# Patient Record
Sex: MALE | Race: WHITE | NOT HISPANIC OR LATINO | Employment: OTHER | URBAN - METROPOLITAN AREA
[De-identification: names, ages, dates, MRNs, and addresses within clinical notes are randomized per-mention and may not be internally consistent; named-entity substitution may affect disease eponyms.]

---

## 2017-05-17 DIAGNOSIS — G40.909 SEIZURE DISORDER (HCC): ICD-10-CM

## 2017-05-17 RX ORDER — PHENOBARBITAL 30 MG/1
30 TABLET ORAL 3 TIMES DAILY
Qty: 90 TAB | Refills: 0 | Status: SHIPPED | OUTPATIENT
Start: 2017-05-17 | End: 2017-05-18 | Stop reason: SDUPTHER

## 2017-05-17 NOTE — TELEPHONE ENCOUNTER
Left msg for pt to schedule an appt.      Was the patient seen in the last year in this department? Yes  9/15/16  Does patient have an active prescription for medications requested? No     Received Request Via: Pharmacy      Requested Prescriptions     Pending Prescriptions Disp Refills   • phenobarbital 30 MG Tab 90 Tab 0     Sig: Take 1 Tab by mouth 3 times a day.

## 2017-05-18 DIAGNOSIS — G40.909 SEIZURE DISORDER (HCC): ICD-10-CM

## 2017-05-18 RX ORDER — PHENOBARBITAL 30 MG/1
30 TABLET ORAL 3 TIMES DAILY
Qty: 90 TAB | Refills: 0 | Status: SHIPPED | OUTPATIENT
Start: 2017-05-18 | End: 2018-04-23 | Stop reason: SDUPTHER

## 2017-06-16 DIAGNOSIS — G40.909 SEIZURE DISORDER (HCC): ICD-10-CM

## 2017-06-16 RX ORDER — PHENOBARBITAL 30 MG/1
TABLET ORAL
Qty: 90 TAB | Refills: 0 | Status: SHIPPED
Start: 2017-06-16 | End: 2017-06-16 | Stop reason: SDUPTHER

## 2017-06-16 RX ORDER — PHENOBARBITAL 30 MG/1
TABLET ORAL
Qty: 90 TAB | Refills: 0 | Status: SHIPPED
Start: 2017-06-16 | End: 2017-10-09 | Stop reason: SDUPTHER

## 2017-06-16 NOTE — TELEPHONE ENCOUNTER
Wanda, [phone]    Pt friend called requesting refill on pt seizure medication, he has medicaid and cannot afford to come in for cash pay. Please advise.

## 2017-07-24 RX ORDER — PHENOBARBITAL 32.4 MG/1
TABLET ORAL
Qty: 90 TAB | Refills: 2 | Status: SHIPPED
Start: 2017-07-24 | End: 2017-07-25 | Stop reason: SDUPTHER

## 2017-07-24 NOTE — TELEPHONE ENCOUNTER
Pt was advised to schedule OV for renewals on med twice. Pt has medicaid and we do not accept this insurance. Pt spouse states she is aware of that and has been trying to get Pt new insurance but is currently in the hospital now and she takes care of all his medication and has not been able to fill out his application for new insurance and states he is not able to fill out his own paper work. Pt is currently out of medication. Spouse states she would appreciate a limited supply until she is able to complete forms for new insurance or able to get him in with a new PCP. Please advise.       Was the patient seen in the last year in this department? Yes     Does patient have an active prescription for medications requested? No     Received Request Via: Patient

## 2017-07-25 DIAGNOSIS — G40.909 SEIZURE DISORDER (HCC): ICD-10-CM

## 2017-07-25 RX ORDER — PHENOBARBITAL 32.4 MG/1
TABLET ORAL
Qty: 90 TAB | Refills: 2 | Status: SHIPPED
Start: 2017-07-25 | End: 2018-08-08 | Stop reason: SDUPTHER

## 2017-10-09 ENCOUNTER — OFFICE VISIT (OUTPATIENT)
Dept: MEDICAL GROUP | Facility: MEDICAL CENTER | Age: 62
End: 2017-10-09
Attending: NURSE PRACTITIONER
Payer: MEDICAID

## 2017-10-09 VITALS
DIASTOLIC BLOOD PRESSURE: 64 MMHG | RESPIRATION RATE: 16 BRPM | OXYGEN SATURATION: 95 % | HEART RATE: 80 BPM | TEMPERATURE: 98.1 F | SYSTOLIC BLOOD PRESSURE: 118 MMHG | HEIGHT: 75 IN | BODY MASS INDEX: 22.38 KG/M2 | WEIGHT: 180 LBS

## 2017-10-09 DIAGNOSIS — Z13.29 SCREENING FOR THYROID DISORDER: ICD-10-CM

## 2017-10-09 DIAGNOSIS — Z12.11 SCREEN FOR COLON CANCER: ICD-10-CM

## 2017-10-09 DIAGNOSIS — Z00.00 ROUTINE HEALTH MAINTENANCE: ICD-10-CM

## 2017-10-09 DIAGNOSIS — G40.909 SEIZURE DISORDER (HCC): ICD-10-CM

## 2017-10-09 DIAGNOSIS — Z13.220 SCREENING CHOLESTEROL LEVEL: ICD-10-CM

## 2017-10-09 DIAGNOSIS — Z13.21 ENCOUNTER FOR VITAMIN DEFICIENCY SCREENING: ICD-10-CM

## 2017-10-09 DIAGNOSIS — Z12.5 SCREENING PSA (PROSTATE SPECIFIC ANTIGEN): ICD-10-CM

## 2017-10-09 PROCEDURE — 99214 OFFICE O/P EST MOD 30 MIN: CPT | Performed by: NURSE PRACTITIONER

## 2017-10-09 PROCEDURE — 99213 OFFICE O/P EST LOW 20 MIN: CPT | Performed by: NURSE PRACTITIONER

## 2017-10-09 RX ORDER — PHENYTOIN SODIUM 100 MG/1
200 CAPSULE, EXTENDED RELEASE ORAL 2 TIMES DAILY
Qty: 120 CAP | Refills: 2 | Status: SHIPPED | OUTPATIENT
Start: 2017-10-09 | End: 2018-01-05 | Stop reason: SDUPTHER

## 2017-10-09 RX ORDER — PHENOBARBITAL 30 MG/1
TABLET ORAL
Qty: 90 TAB | Refills: 2 | Status: SHIPPED | OUTPATIENT
Start: 2017-10-09 | End: 2018-01-23 | Stop reason: SDUPTHER

## 2017-10-09 ASSESSMENT — PAIN SCALES - GENERAL: PAINLEVEL: 5=MODERATE PAIN

## 2017-10-09 NOTE — ASSESSMENT & PLAN NOTE
"Pt has known seizure disorder. Notes reviewed from 10/2016 admit show Pt had T/C sz and was homicidal when post ictal.  Patient reports last time he had any medications for Sz disorder about 4 days ago.  Sometimes gets staring sz and 'cant talk\" for about a minute.  States takes phenobarbital 30 mg TID and Dilantin   "

## 2017-10-09 NOTE — ASSESSMENT & PLAN NOTE
Flu Shot:  I discussed the flu vaccine and why I recommend he get it today, but pt not interested.    Seizure Safety:  Pt is not too interested in Neurology referral as he has had Sz since a child.  I strongly recommended he see Neurology for Consult, avoid alcohol or driving

## 2017-10-09 NOTE — PROGRESS NOTES
"    Chief Complaint   Patient presents with   • Establish Care   • Medication Refill     phenytoin   • Seizure         HISTORY OF THE PRESENT ILLNESS: Patient is a 62 y.o. male.  Gilbert presents to the clinic to establish care as New Patient.    His PMH includes:    Neck Pain  Low Back Pain w sciatica  Seizure Disorder  Tobacco Use-Smoking  Marijuana use  Mild Anemia by previous lab work (10/2016)    REview of Records shows:  Multiple Telephone Encounters for refill of Seizure Meds via [facility]  10/24--> 10/28/2016 Hospital Admit for Seizures, Homicidal Ideation while Post ictal, Leukocytosis    [place]  Report shows:  9/24/17 Phenobarbital 30 mg # 90 by  Julia Chambers  7/27/17 Phenobarbital 32.4 mg # 90 by Pippa Radford ( [facility])  7/24/17 Phenobarbital 30 mg # 90           \"  Similar entries in report for Phenobarbital refills.......  11 Rx and 3 Prescribers................    Seizure disorder  Pt has known seizure disorder. Notes reviewed from 10/2016 admit show Pt had T/C sz and was homicidal when post ictal.  Patient reports last time he had any medications for Sz disorder about 4 days ago.  Sometimes gets staring sz and 'cant talk\" for about a minute.  States takes phenobarbital 30 mg TID and Dilantin     Routine health maintenance  Flu Shot:  I discussed the flu vaccine and why I recommend he get it today, but pt not interested.    Seizure Safety:  Pt is not too interested in Neurology referral as he has had Sz since a child.  I strongly recommended he see Neurology for Consult, avoid alcohol or driving      Allergies: Nkda [no known drug allergy]    Current Outpatient Prescriptions Ordered in [facility]   Medication Sig Dispense Refill   • phenytoin ER (DILANTIN) 100 MG Cap Take 2 Caps by mouth 2 times a day. 120 Cap 2   • phenobarbital 30 MG Tab TAKE three tabs po daily 90 Tab 2   • PHENobarbital (LUMINAL) 32.4 MG Tab TAKE ONE TABLET BY MOUTH THREE TIMES DAILY 90 Tab 2   • " phenobarbital 30 MG Tab Take 1 Tab by mouth 3 times a day. 90 Tab 0   • phenobarbital 30 MG Tab Take 1 Tab by mouth 3 times a day. 90 Tab 3   • phenobarbital (LUMINAL) 30 mg TABS TAKE ONE TABLET BY MOUTH THREE TIMES DAILY 90 Tab 5   • phenobarbital (LUMINAL) 30 mg TABS TAKE ONE TABLET BY MOUTH THREE TIMES DAILY 90 Each 0     No current [facility]-ordered facility-administered medications on file.        Past Medical History:   Diagnosis Date   • Seizure     petit mal, onset childhood       Past Surgical History:   Procedure Laterality Date   • APPENDECTOMY         Social History   Substance Use Topics   • Smoking status: Current Every Day Smoker     Years: 20.00     Types: Cigars   • Smokeless tobacco: Never Used      Comment: advise no tobacco products   • Alcohol use 0.0 oz/week      Comment: occasional beer       Family Status   Relation Status   • Maternal Grandmother    • Mother    • Father Alive     Family History   Problem Relation Age of Onset   • Heart Disease Maternal Grandmother    • Stroke Paternal Grandmother    • Cancer Neg Hx    • Diabetes Neg Hx    • GI Mother        Review of Systems   Constitutional: Negative for fever, chills, weight loss and malaise/fatigue.   HENT: Negative for ear pain, nosebleeds, congestion, sore throat and neck pain.    Eyes: Negative for blurred vision.   Respiratory: Negative for cough, sputum production, shortness of breath and wheezing.    Cardiovascular: Negative for chest pain, palpitations, orthopnea and leg swelling.   Gastrointestinal: Negative for heartburn, nausea, vomiting and abdominal pain.   Genitourinary: Negative for dysuria, urgency and frequency.   Musculoskeletal: Negative for myalgias, back pain and joint pain.   Skin: Negative for rash and itching.   Neurological: Negative for dizziness, tingling, tremors, sensory change, focal weakness and headaches. Positive for Sz Disorder  Endo/Heme/Allergies: Does not bruise/bleed easily.  "  Psychiatric/Behavioral: Negative for depression, anxiety, or memory loss.       Current medications, allergies, and problem list reviewed with patient and updated in  [facility] today.      Exam: Blood pressure 118/64, pulse 80, temperature 36.7 °C (98.1 °F), resp. rate 16, height 1.905 m (6' 3\"), weight 81.6 kg (180 lb), SpO2 95 %.  General: Normal appearing. No distress.  HEENT: Normocephalic. Eyes conjunctiva clear lids without ptosis, pupils equal and reactive to light accommodation, ears normal shape and contour, canals are clear bilaterally, TM's are benign, nasal mucosa benign, oropharynx is without erythema, edema or exudates.   Neck: Supple without JVD. Thyroid is not enlarged.  Pulmonary: Clear to ausculation.  Normal effort. No rales, ronchi, or wheezing.  Cardiovascular: Regular rate and rhythm. Radial pulses are intact and equal bilaterally.  Abdomen: Soft, nontender, nondistended.   Neurologic: Grossly nonfocal  Lymph: No cervical or supraclavicular lymph nodes are palpable  Skin: Warm and dry.  No obvious lesions.  Musculoskeletal: Normal gait. No extremity cyanosis, clubbing, or edema.  Psych:Slightly on edge or mild cantankerous talk and affect. Alert and oriented x3. Judgment and insight is normal.        Assessment/Plan  1. Routine health maintenance  CBC WITH DIFFERENTIAL    COMP METABOLIC PANEL   2. Screening for thyroid disorder  TSH   3. Encounter for vitamin deficiency screening  VITAMIN D,25 HYDROXY    VITAMIN B12   4. Screening cholesterol level  LIPID PROFILE   5. Screening PSA (prostate specific antigen)  PROSTATE SPECIFIC AG SCREENING   6. Screen for colon cancer  OCCULT BLOOD FECES IMMUNOASSAY   7. Seizure disorder (CMS-HCC)  DILANTIN, PHENOBARBITAL LEVELS    REFERRAL TO NEUROLOGY    phenytoin ER (DILANTIN) 100 MG Cap (200 mg BID)    phenobarbital 30 MG Tab TID   Follow up in 1 month for review of labs. Call or return if questions, concerns, or worsening condition.  Instructed Pt to avoid " alcohol and do not drive vehicles.

## 2017-11-06 ENCOUNTER — HOSPITAL ENCOUNTER (OUTPATIENT)
Dept: LAB | Facility: MEDICAL CENTER | Age: 62
End: 2017-11-06
Attending: NURSE PRACTITIONER
Payer: MEDICAID

## 2017-11-06 DIAGNOSIS — Z13.220 SCREENING CHOLESTEROL LEVEL: ICD-10-CM

## 2017-11-06 DIAGNOSIS — Z13.21 ENCOUNTER FOR VITAMIN DEFICIENCY SCREENING: ICD-10-CM

## 2017-11-06 DIAGNOSIS — Z12.5 SCREENING PSA (PROSTATE SPECIFIC ANTIGEN): ICD-10-CM

## 2017-11-06 DIAGNOSIS — Z13.29 SCREENING FOR THYROID DISORDER: ICD-10-CM

## 2017-11-06 DIAGNOSIS — G40.909 SEIZURE DISORDER (HCC): ICD-10-CM

## 2017-11-06 DIAGNOSIS — Z00.00 ROUTINE HEALTH MAINTENANCE: ICD-10-CM

## 2017-11-06 LAB
25(OH)D3 SERPL-MCNC: 10 NG/ML (ref 30–100)
ALBUMIN SERPL BCP-MCNC: 4 G/DL (ref 3.2–4.9)
ALBUMIN/GLOB SERPL: 1.4 G/DL
ALP SERPL-CCNC: 87 U/L (ref 30–99)
ALT SERPL-CCNC: 25 U/L (ref 2–50)
ANION GAP SERPL CALC-SCNC: 6 MMOL/L (ref 0–11.9)
AST SERPL-CCNC: 20 U/L (ref 12–45)
BASOPHILS # BLD AUTO: 0.8 % (ref 0–1.8)
BASOPHILS # BLD: 0.07 K/UL (ref 0–0.12)
BILIRUB SERPL-MCNC: 0.7 MG/DL (ref 0.1–1.5)
BUN SERPL-MCNC: 13 MG/DL (ref 8–22)
CALCIUM SERPL-MCNC: 9.3 MG/DL (ref 8.5–10.5)
CHLORIDE SERPL-SCNC: 102 MMOL/L (ref 96–112)
CHOLEST SERPL-MCNC: 157 MG/DL (ref 100–199)
CO2 SERPL-SCNC: 27 MMOL/L (ref 20–33)
CREAT SERPL-MCNC: 0.75 MG/DL (ref 0.5–1.4)
EOSINOPHIL # BLD AUTO: 0 K/UL (ref 0–0.51)
EOSINOPHIL NFR BLD: 0 % (ref 0–6.9)
ERYTHROCYTE [DISTWIDTH] IN BLOOD BY AUTOMATED COUNT: 43.7 FL (ref 35.9–50)
GFR SERPL CREATININE-BSD FRML MDRD: >60 ML/MIN/1.73 M 2
GLOBULIN SER CALC-MCNC: 2.8 G/DL (ref 1.9–3.5)
GLUCOSE SERPL-MCNC: 75 MG/DL (ref 65–99)
HCT VFR BLD AUTO: 49.1 % (ref 42–52)
HDLC SERPL-MCNC: 52 MG/DL
HGB BLD-MCNC: 16.8 G/DL (ref 14–18)
IMM GRANULOCYTES # BLD AUTO: 0.03 K/UL (ref 0–0.11)
IMM GRANULOCYTES NFR BLD AUTO: 0.3 % (ref 0–0.9)
LDLC SERPL CALC-MCNC: 92 MG/DL
LYMPHOCYTES # BLD AUTO: 2.45 K/UL (ref 1–4.8)
LYMPHOCYTES NFR BLD: 28.6 % (ref 22–41)
MCH RBC QN AUTO: 32.6 PG (ref 27–33)
MCHC RBC AUTO-ENTMCNC: 34.2 G/DL (ref 33.7–35.3)
MCV RBC AUTO: 95.3 FL (ref 81.4–97.8)
MONOCYTES # BLD AUTO: 0.33 K/UL (ref 0–0.85)
MONOCYTES NFR BLD AUTO: 3.8 % (ref 0–13.4)
NEUTROPHILS # BLD AUTO: 5.7 K/UL (ref 1.82–7.42)
NEUTROPHILS NFR BLD: 66.5 % (ref 44–72)
NRBC # BLD AUTO: 0 K/UL
NRBC BLD AUTO-RTO: 0 /100 WBC
PHENOBARB SERPL-MCNC: 17.4 UG/ML (ref 15–40)
PHENYTOIN SERPL-MCNC: 10 UG/ML (ref 10–20)
PLATELET # BLD AUTO: 301 K/UL (ref 164–446)
PMV BLD AUTO: 9.4 FL (ref 9–12.9)
POTASSIUM SERPL-SCNC: 4.2 MMOL/L (ref 3.6–5.5)
PROT SERPL-MCNC: 6.8 G/DL (ref 6–8.2)
PSA SERPL-MCNC: 0.64 NG/ML (ref 0–4)
RBC # BLD AUTO: 5.15 M/UL (ref 4.7–6.1)
SODIUM SERPL-SCNC: 135 MMOL/L (ref 135–145)
TRIGL SERPL-MCNC: 65 MG/DL (ref 0–149)
TSH SERPL DL<=0.005 MIU/L-ACNC: 1.65 UIU/ML (ref 0.3–3.7)
VIT B12 SERPL-MCNC: 316 PG/ML (ref 211–911)
WBC # BLD AUTO: 8.6 K/UL (ref 4.8–10.8)

## 2017-11-06 PROCEDURE — 82607 VITAMIN B-12: CPT

## 2017-11-06 PROCEDURE — 80053 COMPREHEN METABOLIC PANEL: CPT

## 2017-11-06 PROCEDURE — 80185 ASSAY OF PHENYTOIN TOTAL: CPT

## 2017-11-06 PROCEDURE — 84443 ASSAY THYROID STIM HORMONE: CPT

## 2017-11-06 PROCEDURE — 36415 COLL VENOUS BLD VENIPUNCTURE: CPT

## 2017-11-06 PROCEDURE — 80184 ASSAY OF PHENOBARBITAL: CPT

## 2017-11-06 PROCEDURE — 80061 LIPID PANEL: CPT

## 2017-11-06 PROCEDURE — 84153 ASSAY OF PSA TOTAL: CPT

## 2017-11-06 PROCEDURE — 82306 VITAMIN D 25 HYDROXY: CPT

## 2017-11-06 PROCEDURE — 85025 COMPLETE CBC W/AUTO DIFF WBC: CPT

## 2017-11-08 ENCOUNTER — OFFICE VISIT (OUTPATIENT)
Dept: MEDICAL GROUP | Facility: MEDICAL CENTER | Age: 62
End: 2017-11-08
Attending: NURSE PRACTITIONER
Payer: MEDICAID

## 2017-11-08 VITALS
OXYGEN SATURATION: 94 % | RESPIRATION RATE: 16 BRPM | WEIGHT: 180 LBS | DIASTOLIC BLOOD PRESSURE: 60 MMHG | TEMPERATURE: 98 F | SYSTOLIC BLOOD PRESSURE: 102 MMHG | BODY MASS INDEX: 22.38 KG/M2 | HEIGHT: 75 IN | HEART RATE: 64 BPM

## 2017-11-08 DIAGNOSIS — E55.9 VITAMIN D DEFICIENCY: ICD-10-CM

## 2017-11-08 DIAGNOSIS — G40.909 SEIZURE DISORDER (HCC): ICD-10-CM

## 2017-11-08 PROCEDURE — 99213 OFFICE O/P EST LOW 20 MIN: CPT | Performed by: NURSE PRACTITIONER

## 2017-11-08 ASSESSMENT — PATIENT HEALTH QUESTIONNAIRE - PHQ9: CLINICAL INTERPRETATION OF PHQ2 SCORE: 0

## 2017-11-08 NOTE — ASSESSMENT & PLAN NOTE
Pt has known Sz disorder and is taking Phenobarbital (90 mg /day) and Dilantin( 200 mg/day)  We reviewed his low normal levels of both.  I strongly recommended he call Neurology ( Dr Barber referral approved) for consult r/t his   Long standing Sz disorder.

## 2017-11-08 NOTE — ASSESSMENT & PLAN NOTE
We reviewed all his lab results including his very low Vitamin D level.  I recommended a daily Vit D3 RX 4,000 units a day.  We discussed the important reasons to have an adequate Vit D level.

## 2017-11-08 NOTE — PROGRESS NOTES
"    Chief Complaint: Results    HPI:  Gilbert presents to the clinic for results.    His PMH includes:  Neck Pain  Low Back Pain w sciatica  Seizure Disorder  Tobacco Use-Smoking  Marijuana use  Mild Anemia by previous lab work (10/2016)     REview of Records shows:     10/9/17 Clinic New Patient Visit, Labs ordered, RX refills. Referral to Neurology r/t Sz's  Multiple Telephone Encounters for refill of Seizure Meds via [facility]  10/24--> 10/28/2016 Hospital Admit for Seizures, Homicidal Ideation while Post ictal, Leukocytosis     [place]  Report shows:  9/24/17 Phenobarbital 30 mg # 90 by  Julia Chambers  7/27/17 Phenobarbital 32.4 mg # 90 by Pippa Radford ( [facility])  7/24/17 Phenobarbital 30 mg # 90           \"  Similar entries in report for Phenobarbital refills.......  11 Rx and 3 Prescribers................    Results Review:  11/6/17 Labs- CBC normal, CMP normal, Lipids normal, Vit B12 normal, PSA= 0.64, TSH= 1.650                          Dilantin= 10.0 ( 10-20), Phenobarbital Level= 17.4 ( 15-40)                         Vitamin D= 10 (very low)    Seizure disorder  Pt has known Sz disorder and is taking Phenobarbital (90 mg /day) and Dilantin( 200 mg/day)  We reviewed his low normal levels of both.  I strongly recommended he call Neurology ( Dr Barber referral approved) for consult r/t his   Long standing Sz disorder.      Vitamin D deficiency  We reviewed all his lab results including his very low Vitamin D level.  I recommended a daily Vit D3 RX 4,000 units a day.  We discussed the important reasons to have an adequate Vit D level.      Patient Active Problem List    Diagnosis Date Noted   • Vitamin D deficiency 11/08/2017   • Routine health maintenance 10/09/2017   • Seizure disorder (CMS-[facility]) 12/05/2014       Allergies:Nkda [no known drug allergy]    Current Outpatient Prescriptions   Medication Sig Dispense Refill   • Cholecalciferol 4000 units Cap Take 1 Capsule by mouth " "every day. 30 Cap 5   • phenytoin ER (DILANTIN) 100 MG Cap Take 2 Caps by mouth 2 times a day. 120 Cap 2   • phenobarbital 30 MG Tab TAKE three tabs po daily 90 Tab 2   • PHENobarbital (LUMINAL) 32.4 MG Tab TAKE ONE TABLET BY MOUTH THREE TIMES DAILY 90 Tab 2   • phenobarbital 30 MG Tab Take 1 Tab by mouth 3 times a day. 90 Tab 0   • phenobarbital 30 MG Tab Take 1 Tab by mouth 3 times a day. 90 Tab 3   • phenobarbital (LUMINAL) 30 mg TABS TAKE ONE TABLET BY MOUTH THREE TIMES DAILY 90 Tab 5   • phenobarbital (LUMINAL) 30 mg TABS TAKE ONE TABLET BY MOUTH THREE TIMES DAILY 90 Each 0     No current facility-administered medications for this visit.        Social History   Substance Use Topics   • Smoking status: Current Every Day Smoker     Years: 20.00     Types: Cigars   • Smokeless tobacco: Never Used      Comment: advise no tobacco products   • Alcohol use 0.0 oz/week      Comment: occasional beer       Family History   Problem Relation Age of Onset   • Heart Disease Maternal Grandmother    • GI Mother    • Stroke Paternal Grandmother    • Cancer Neg Hx    • Diabetes Neg Hx        ROS:  Review of Systems   See HPI Above    Exam:  Blood pressure 102/60, pulse 64, temperature 36.7 °C (98 °F), resp. rate 16, height 1.905 m (6' 3\"), weight 81.6 kg (180 lb), SpO2 94 %.  General:  Well nourished, well developed male in NAD  HENT:Head is grossly normal. PERRL.  Neck: Supple. Trachea is midline.  Pulmonary: Clear to ausculation .  Normal effort. No rales, ronchi, or wheezing.   Cardiovascular: Regular rate and rhythm.  Abdomen-Abdomen is soft, No tenderness.  Upper extremities- Strong = . Good ROM  Lower extremities- neg for edema, redness, tenderness.  Neuro- A & O x 4. Speech clear and appropriate.     Current medications, allergies, and problem list reviewed with patient and updated in  [facility] today.    Assessment/Plan:  1. Seizure disorder (CMS-HCC)  Continue Phenobarbital 300 mg/day  And Dilantin 400 mg/day.  Pt " instructed to call Neurology-Dr Barber for consultation appt    2. Vitamin D deficiency  Cholecalciferol 4000 units Cap   Follow up in 3 months. Call or return if questions, concerns, or worsening condition.

## 2018-01-05 DIAGNOSIS — G40.909 SEIZURE DISORDER (HCC): ICD-10-CM

## 2018-01-08 RX ORDER — PHENYTOIN SODIUM 100 MG/1
CAPSULE, EXTENDED RELEASE ORAL
Qty: 120 CAP | Refills: 1 | Status: SHIPPED | OUTPATIENT
Start: 2018-01-08 | End: 2018-03-05 | Stop reason: SDUPTHER

## 2018-01-23 DIAGNOSIS — G40.909 SEIZURE DISORDER (HCC): ICD-10-CM

## 2018-01-23 RX ORDER — PHENOBARBITAL 30 MG/1
TABLET ORAL
Qty: 90 TAB | Refills: 1 | Status: SHIPPED | OUTPATIENT
Start: 2018-01-23 | End: 2018-01-26 | Stop reason: SDUPTHER

## 2018-01-26 DIAGNOSIS — G40.909 SEIZURE DISORDER (HCC): ICD-10-CM

## 2018-01-26 RX ORDER — PHENOBARBITAL 30 MG/1
TABLET ORAL
Qty: 90 TAB | Refills: 3 | Status: SHIPPED | OUTPATIENT
Start: 2018-01-26 | End: 2018-02-25

## 2018-03-05 DIAGNOSIS — G40.909 SEIZURE DISORDER (HCC): ICD-10-CM

## 2018-03-05 RX ORDER — PHENYTOIN SODIUM 100 MG/1
CAPSULE, EXTENDED RELEASE ORAL
Qty: 120 CAP | Refills: 2 | Status: SHIPPED | OUTPATIENT
Start: 2018-03-05 | End: 2018-06-05 | Stop reason: SDUPTHER

## 2018-03-05 NOTE — TELEPHONE ENCOUNTER
Please call Gilbert and let him know I refilled his Dilantin( phenytoin) RX today.  Please remind him that he was referred to Neurology ( DR Barber) back in November for his Seizure Disorder. I have placed a new referral as that one probably has .  He needs to call our Referral Dept  At the end of this week to find out which Neurologist is approved and to call and make an appt.  Gordo

## 2018-04-23 DIAGNOSIS — G40.909 SEIZURE DISORDER (HCC): ICD-10-CM

## 2018-04-23 RX ORDER — PHENOBARBITAL 30 MG/1
TABLET ORAL
Qty: 90 TAB | Refills: 2 | Status: SHIPPED | OUTPATIENT
Start: 2018-04-23 | End: 2018-05-23

## 2018-04-23 NOTE — TELEPHONE ENCOUNTER
Please call Gilbert and let him know I refilled his Seizure medication, Phenobarbital today, but he needs to call and establish w Neurology  Referral recently approved:  [facility]   [address]  [id number]  Ph: [phone]      Please call this number after your appointment has been made to set up your transportation to [place]. [facility]SP[facility]  [phone]

## 2018-06-05 DIAGNOSIS — G40.909 SEIZURE DISORDER (HCC): ICD-10-CM

## 2018-06-06 RX ORDER — PHENYTOIN SODIUM 100 MG/1
CAPSULE, EXTENDED RELEASE ORAL
Qty: 120 CAP | Refills: 1 | Status: SHIPPED | OUTPATIENT
Start: 2018-06-06 | End: 2018-08-03 | Stop reason: SDUPTHER

## 2018-07-23 DIAGNOSIS — G40.909 SEIZURE DISORDER (HCC): ICD-10-CM

## 2018-07-23 RX ORDER — PHENOBARBITAL 30 MG/1
TABLET ORAL
Qty: 90 TAB | Refills: 2 | Status: SHIPPED | OUTPATIENT
Start: 2018-07-23 | End: 2018-07-26 | Stop reason: SDUPTHER

## 2018-07-24 NOTE — TELEPHONE ENCOUNTER
Rx faxed to:    [id number] - [place], [place] - [address] [address]  Phone: [phone] Fax: [phone]    phenobarbital 30 MG Tab 90 Tab 2/2 7/23/2018 8/22/2018    Sig: TAKE 3 TABLETS ORALLY ONCE DAILY

## 2018-07-26 DIAGNOSIS — G40.909 SEIZURE DISORDER (HCC): ICD-10-CM

## 2018-07-26 RX ORDER — PHENOBARBITAL 30 MG/1
TABLET ORAL
Qty: 90 TAB | Refills: 0 | Status: SHIPPED | OUTPATIENT
Start: 2018-07-26 | End: 2018-07-26

## 2018-08-03 DIAGNOSIS — G40.909 SEIZURE DISORDER (HCC): ICD-10-CM

## 2018-08-04 RX ORDER — PHENYTOIN SODIUM 100 MG/1
CAPSULE, EXTENDED RELEASE ORAL
Qty: 120 CAP | Refills: 2 | Status: SHIPPED | OUTPATIENT
Start: 2018-08-04 | End: 2018-08-08 | Stop reason: SDUPTHER

## 2018-08-08 ENCOUNTER — OFFICE VISIT (OUTPATIENT)
Dept: MEDICAL GROUP | Facility: MEDICAL CENTER | Age: 63
End: 2018-08-08
Attending: NURSE PRACTITIONER
Payer: MEDICAID

## 2018-08-08 VITALS
WEIGHT: 176 LBS | OXYGEN SATURATION: 94 % | BODY MASS INDEX: 21.88 KG/M2 | RESPIRATION RATE: 16 BRPM | HEART RATE: 76 BPM | HEIGHT: 75 IN | TEMPERATURE: 98.9 F | SYSTOLIC BLOOD PRESSURE: 100 MMHG | DIASTOLIC BLOOD PRESSURE: 62 MMHG

## 2018-08-08 DIAGNOSIS — E55.9 VITAMIN D DEFICIENCY: ICD-10-CM

## 2018-08-08 DIAGNOSIS — G40.909 SEIZURE DISORDER (HCC): ICD-10-CM

## 2018-08-08 DIAGNOSIS — Z12.11 SCREEN FOR COLON CANCER: ICD-10-CM

## 2018-08-08 PROCEDURE — 99212 OFFICE O/P EST SF 10 MIN: CPT | Performed by: NURSE PRACTITIONER

## 2018-08-08 PROCEDURE — 99213 OFFICE O/P EST LOW 20 MIN: CPT | Performed by: NURSE PRACTITIONER

## 2018-08-08 RX ORDER — PHENYTOIN SODIUM 100 MG/1
400 CAPSULE, EXTENDED RELEASE ORAL DAILY
Qty: 120 CAP | Refills: 3 | Status: SHIPPED | OUTPATIENT
Start: 2018-08-08 | End: 2018-12-05 | Stop reason: SDUPTHER

## 2018-08-08 RX ORDER — CHOLECALCIFEROL (VITAMIN D3) 50 MCG
CAPSULE ORAL
Qty: 60 CAP | Refills: 4 | Status: SHIPPED | OUTPATIENT
Start: 2018-08-08 | End: 2018-11-29

## 2018-08-08 RX ORDER — PHENOBARBITAL 32.4 MG/1
97.2 TABLET ORAL DAILY
Qty: 90 TAB | Refills: 3 | Status: SHIPPED | OUTPATIENT
Start: 2018-08-08 | End: 2018-09-07

## 2018-08-08 NOTE — PROGRESS NOTES
Chief Complaint:   Chief Complaint   Patient presents with   • Medication Refill       HPI:  Gilbert is here today for a follow-up on Seizure Disorder    His PMH includes:  Neck Pain  Low Back Pain w sciatica  Seizure Disorder  Tobacco Use-Smoking  Marijuana use  Mild Anemia by previous lab work (10/2016)     REview of Records shows:  11/8/18 Clinic visit for REsults. SZ med refills. RX Vit D. Instructed to establish w Neurology.   11/6/17 Labs- CBC normal, CMP normal, Lipids normal, Vit B12 normal, PSA= 0.64, TSH= 1.650                          Dilantin= 10.0 ( 10-20), Phenobarbital Level= 17.4 ( 15-40)                         Vitamin D= 10 (very low)   10/9/17 Clinic New Patient Visit, Labs ordered, RX refills. Referral to Neurology r/t Bia's  Multiple Telephone Encounters for refill of Seizure Meds via [facility]  10/24--> 10/28/2016 Hospital Admit for Seizures, Homicidal Ideation while Post ictal, Leukocytosis     [place]  Report shows:  7/26/18 Phenobarbitol 30 mg # 90 by me  6/25/18 similar entry  10 RX and 3 Prescribers.  -------------------------------------------------------       Vitamin D deficiency  Pt has known Vit D def  Has not been taking Vit D  Will re-order and discussed why an optimal level of vit D is impt.    Seizure disorder  Pt is here for med refills of his SZ Disorder.  Denies any seizures this year.  Reports he did get call from neurology and now has appt to  Establish 12/5/18 w [facility] ( Quyen Bartholomew)    I have asked him to continue his current Phenobarbital and Dilantin doses  And get Pheonbarb and Dilantin Level in late November prior to his appt  w Neurology in early December.      Patient Active Problem List    Diagnosis Date Noted   • Vitamin D deficiency 11/08/2017   • Routine health maintenance 10/09/2017   • Seizure disorder (HCC) 12/05/2014       Allergies:Nkda [no known drug allergy]    Medicines as of today:  Current Outpatient Prescriptions   Medication  "Sig Dispense Refill   • PHENobarbital (LUMINAL) 32.4 MG Tab Take 3 Tabs by mouth every day for 30 days. TAKE ONE TABLET BY MOUTH THREE TIMES DAILY 90 Tab 3   • phenytoin ER (DILANTIN) 100 MG Cap Take 4 Caps by mouth every day. 120 Cap 3   • Cholecalciferol 4000 units Cap Take 1 Capsule by mouth every day. 30 Cap 5   • phenobarbital (LUMINAL) 30 mg TABS TAKE ONE TABLET BY MOUTH THREE TIMES DAILY 90 Tab 5   • phenobarbital (LUMINAL) 30 mg TABS TAKE ONE TABLET BY MOUTH THREE TIMES DAILY 90 Each 0     No current facility-administered medications for this visit.        Social History   Substance Use Topics   • Smoking status: Current Every Day Smoker     Years: 20.00     Types: Cigars   • Smokeless tobacco: Never Used      Comment: advise no tobacco products   • Alcohol use 0.0 oz/week      Comment: occasional beer       Past Medical History:   Diagnosis Date   • Seizure (CMS-HCC) (HCC)     petit mal, onset childhood       Family History   Problem Relation Age of Onset   • Heart Disease Maternal Grandmother    • GI Mother    • Stroke Paternal Grandmother    • Cancer Neg Hx    • Diabetes Neg Hx        ROS:  Review of Systems   See HPI Above    Exam:  Blood pressure 100/62, pulse 76, temperature 37.2 °C (98.9 °F), resp. rate 16, height 1.905 m (6' 3\"), weight 79.8 kg (176 lb), SpO2 94 %. Body mass index is 22 kg/m².    General:  Well nourished, well developed male in NAD  HENT:Head is grossly normal. PERRL.  Neck: Supple. Trachea is midline.  Pulmonary: Clear to ausculation.  Normal effort.   Cardiovascular: Regular rate and rhythm.  Abdomen-Abdomen is soft  Upper extremities- WNL   Lower extremities- neg for edema  Neuro- A & O x 4. Speech clear and appropriate. Ambulates w steady gait and good balance    Current medications, allergies, and problem list reviewed with patient and updated in [facility] today.    Assessment/Plan:  1. Seizure disorder (HCC)  PHENOBARBITAL Level prior to appt w neurology    DILANTIN prior to appt w " Neurology.  To keep appt w Quyen Bartholomew- [facility] set for 12/5/18 to establish.    PHENobarbital (LUMINAL) 32.4 MG Tab    phenytoin ER (DILANTIN) 100 MG Cap   2. Screen for colon cancer  OCCULT BLOOD FECES IMMUNOASSAY (FIT)   3. Vitamin D deficiency  Cholecalciferol 4000 units Cap       Return in about 6 months (around 2/8/2019).

## 2018-08-08 NOTE — ASSESSMENT & PLAN NOTE
Pt has known Vit D def  Has not been taking Vit D  Will re-order and discussed why an optimal level of vit D is impt.

## 2018-08-08 NOTE — ASSESSMENT & PLAN NOTE
Pt is here for med refills of his SZ Disorder.  Denies any seizures this year.  Reports he did get call from neurology and now has appt to  Establish 12/5/18 w [facility] ( Quyen Bartholomew)    I have asked him to continue his current Phenobarbital and Dilantin doses  And get Pheonbarb and Dilantin Level in late November prior to his appt  w Neurology in early December.

## 2018-09-18 DIAGNOSIS — G40.909 SEIZURE DISORDER (HCC): ICD-10-CM

## 2018-09-18 RX ORDER — PHENOBARBITAL 30 MG/1
TABLET ORAL
Qty: 90 TAB | Refills: 0 | Status: SHIPPED | OUTPATIENT
Start: 2018-09-18 | End: 2018-10-16 | Stop reason: SDUPTHER

## 2018-11-16 DIAGNOSIS — G40.909 SEIZURE DISORDER (HCC): ICD-10-CM

## 2018-11-19 RX ORDER — PHENOBARBITAL 30 MG/1
TABLET ORAL
Qty: 90 TAB | Refills: 0 | Status: SHIPPED | OUTPATIENT
Start: 2018-11-19 | End: 2018-11-21 | Stop reason: SDUPTHER

## 2018-11-21 DIAGNOSIS — G40.909 SEIZURE DISORDER (HCC): ICD-10-CM

## 2018-11-21 RX ORDER — PHENOBARBITAL 30 MG/1
30 TABLET ORAL 3 TIMES DAILY
Qty: 90 TAB | Refills: 0 | Status: SHIPPED | OUTPATIENT
Start: 2018-11-21 | End: 2018-11-29

## 2018-11-21 NOTE — TELEPHONE ENCOUNTER
Tried to contact patient about Rx being ready for  but number is disconnected. Rx will be at  for .

## 2018-11-26 ENCOUNTER — HOSPITAL ENCOUNTER (OUTPATIENT)
Dept: LAB | Facility: MEDICAL CENTER | Age: 63
End: 2018-11-26
Attending: NURSE PRACTITIONER
Payer: MEDICAID

## 2018-11-26 DIAGNOSIS — G40.909 SEIZURE DISORDER (HCC): ICD-10-CM

## 2018-11-26 PROCEDURE — 36415 COLL VENOUS BLD VENIPUNCTURE: CPT

## 2018-11-26 PROCEDURE — 80185 ASSAY OF PHENYTOIN TOTAL: CPT

## 2018-11-26 PROCEDURE — 80184 ASSAY OF PHENOBARBITAL: CPT

## 2018-11-27 LAB
PHENOBARB SERPL-MCNC: 13.4 UG/ML (ref 15–40)
PHENYTOIN SERPL-MCNC: 6.6 UG/ML (ref 10–20)

## 2018-11-29 ENCOUNTER — OFFICE VISIT (OUTPATIENT)
Dept: MEDICAL GROUP | Facility: MEDICAL CENTER | Age: 63
End: 2018-11-29
Attending: NURSE PRACTITIONER
Payer: MEDICAID

## 2018-11-29 VITALS
OXYGEN SATURATION: 95 % | SYSTOLIC BLOOD PRESSURE: 100 MMHG | HEIGHT: 75 IN | HEART RATE: 74 BPM | WEIGHT: 174 LBS | DIASTOLIC BLOOD PRESSURE: 60 MMHG | RESPIRATION RATE: 16 BRPM | BODY MASS INDEX: 21.64 KG/M2 | TEMPERATURE: 99.3 F

## 2018-11-29 DIAGNOSIS — G40.909 SEIZURE DISORDER (HCC): ICD-10-CM

## 2018-11-29 PROCEDURE — 99213 OFFICE O/P EST LOW 20 MIN: CPT | Performed by: NURSE PRACTITIONER

## 2018-11-29 RX ORDER — PHENOBARBITAL 32.4 MG/1
32.4 TABLET ORAL 3 TIMES DAILY
Qty: 90 TAB | Refills: 0 | Status: SHIPPED | OUTPATIENT
Start: 2018-11-29 | End: 2018-12-05 | Stop reason: SDUPTHER

## 2018-11-29 RX ORDER — ERGOCALCIFEROL 1.25 MG/1
50000 CAPSULE ORAL
Qty: 4 CAP | Refills: 5 | Status: SHIPPED | OUTPATIENT
Start: 2018-11-29 | End: 2021-06-15 | Stop reason: SDUPTHER

## 2018-11-29 RX ORDER — PHENYTOIN SODIUM 100 MG/1
CAPSULE, EXTENDED RELEASE ORAL
COMMUNITY
Start: 2018-10-01 | End: 2018-11-29

## 2018-11-29 RX ORDER — PHENYTOIN SODIUM 100 MG/1
CAPSULE, EXTENDED RELEASE ORAL
COMMUNITY
Start: 2018-10-30 | End: 2018-11-29

## 2018-11-29 ASSESSMENT — PATIENT HEALTH QUESTIONNAIRE - PHQ9: CLINICAL INTERPRETATION OF PHQ2 SCORE: 0

## 2018-11-30 NOTE — ASSESSMENT & PLAN NOTE
"Patient reports he has had absence seizures more frequently. 'smaller than before\".  States has been taking his meds as directed  Dilantin 100 mg - 4 at once  And phenobarbitol 30 mg- 3  "

## 2018-11-30 NOTE — PROGRESS NOTES
"Chief Complaint:   Chief Complaint   Patient presents with   • Seizure       HPI:  Gilbert is here today for a follow-up on Seizure Disorder.    His PMH includes:  Neck Pain  Low Back Pain w sciatica  Seizure Disorder  Tobacco Use-Smoking  Marijuana use  Mild Anemia by previous lab work (10/2016)     REview of Records shows:  11/26/18 Phenobarb= 13.4 ( 15-40), Dilantin= 6.6 ( 10-20)  8/8/18 Clinic for Med Refills. REports has 1st appt w Quyen Bartholomew on 12/5/18 to establish r/t seizures.  Order for Phenobarb and Dilantin level to complete prior to Dec appt w Neurology.    11/8/17 Clinic visit for REsults. SZ med refills. RX Vit D. Instructed to establish w Neurology.   11/6/17 Labs- CBC normal, CMP normal, Lipids normal, Vit B12 normal, PSA= 0.64, TSH= 1.650                          Dilantin= 10.0 ( 10-20), Phenobarbital Level= 17.4 ( 15-40)                         Vitamin D= 10 (very low)   10/9/17 Clinic New Patient Visit, Labs ordered, RX refills. Referral to Neurology r/t Sz's  Multiple Telephone Encounters for refill of Seizure Meds via [facility]  10/24--> 10/28/2016 Hospital Admit for Seizures, Homicidal Ideation while Post ictal, Leukocytosis     [place]  Report shows:  11/21/18 Phenobarbitol 30 mg # 90 by me  10/17/18 # 90 similar entry  10 RX and 3 Prescribers.  -------------------------------------------------------      Seizure disorder  Patient reports he has had absence seizures more frequently. 'smaller than before\".  He reports recently more little seizures that are absence.  States has been taking his meds as directed.  Dilantin  mg - 4 at once  And phenobarbitol 30 mg- 3 at once    Denies any recent tonic clonic seizures.    Given address and phone # and time for his appt w   Quyen Bartholomew on 12/5/18. We discussed the importance  Of keeping this appt to establish w Neurology.    Will increase his Phenobarb to 32.4 mg x 3 per day. Recommend he spread them out rather than take " "all at once, but he reports \"i'd forget if I did\" so will take at once in am.    To continue on current Dilantin 400 mg ER  Dose per day until sees Neurology and then to follow their directions.      Patient Active Problem List    Diagnosis Date Noted   • Vitamin D deficiency 11/08/2017   • Routine health maintenance 10/09/2017   • Seizure disorder (HCC) 12/05/2014       Allergies:Nkda [no known drug allergy]    Medicines as of today:  Current Outpatient Prescriptions   Medication Sig Dispense Refill   • PHENobarbital (LUMINAL) 32.4 MG Tab Take 1 Tab by mouth 3 times a day for 30 days. 90 Tab 0   • ergocalciferol (DRISDOL) 50560 UNIT capsule Take 1 Cap by mouth every 7 days. 4 Cap 5   • phenytoin ER (DILANTIN) 100 MG Cap Take 4 Caps by mouth every day. 120 Cap 3     No current facility-administered medications for this visit.        Social History   Substance Use Topics   • Smoking status: Current Every Day Smoker     Years: 20.00     Types: Cigars   • Smokeless tobacco: Never Used      Comment: advise no tobacco products   • Alcohol use 0.0 oz/week      Comment: occasional beer       Past Medical History:   Diagnosis Date   • Seizure (HCC)     petit mal, onset childhood       Family History   Problem Relation Age of Onset   • Heart Disease Maternal Grandmother    • GI Mother    • Stroke Paternal Grandmother    • Cancer Neg Hx    • Diabetes Neg Hx        ROS:  Review of Systems   See HPI Above    Exam:  Blood pressure 100/60, pulse 74, temperature 37.4 °C (99.3 °F), temperature source Temporal, resp. rate 16, height 1.905 m (6' 3\"), weight 78.9 kg (174 lb), SpO2 95 %. Body mass index is 21.75 kg/m².    General:  Well nourished, well developed male in NAD  HENT:Head is grossly normal.   Neck: Supple. Trachea is midline.  Pulmonary: Clear but slightly diminished to ausculation .  Normal effort. No rales, ronchi, or wheezing.   Cardiovascular: Regular rate and rhythm.  Abdomen-Abdomen is soft  Upper extremities-  Good " ROM  Lower extremities- neg for edema  Neuro- A & O x 4. Speech clear and appropriate. Ambulate w good  Balance and steady gait.    Current medications, allergies, and problem list reviewed with patient and updated in [facility] today.    Assessment/Plan:  1. Seizure disorder (HCC)  PHENobarbital (LUMINAL) 32.4 MG Tab- #90 Increase in dose  To keep appt to Establish w Neurology(Quyen Bartholomew) on 12/5/18 as planned.  Continue Dilantin  mg /day.   2.      VITAMIN D DEFICIENCY ergocalciferol (DRISDOL) 34987 UNIT capsule       Return in about 7 weeks (around 1/17/2019).

## 2018-12-05 ENCOUNTER — OFFICE VISIT (OUTPATIENT)
Dept: NEUROLOGY | Facility: MEDICAL CENTER | Age: 63
End: 2018-12-05
Payer: MEDICAID

## 2018-12-05 VITALS
SYSTOLIC BLOOD PRESSURE: 112 MMHG | HEIGHT: 75 IN | OXYGEN SATURATION: 92 % | TEMPERATURE: 98.2 F | DIASTOLIC BLOOD PRESSURE: 60 MMHG | HEART RATE: 70 BPM | WEIGHT: 175 LBS | BODY MASS INDEX: 21.76 KG/M2 | RESPIRATION RATE: 16 BRPM

## 2018-12-05 DIAGNOSIS — G40.909 SEIZURE DISORDER (HCC): ICD-10-CM

## 2018-12-05 DIAGNOSIS — R41.3 MEMORY DEFICIT: ICD-10-CM

## 2018-12-05 DIAGNOSIS — Z91.89 AT RISK FOR OSTEOPOROSIS: ICD-10-CM

## 2018-12-05 PROCEDURE — 99214 OFFICE O/P EST MOD 30 MIN: CPT | Performed by: NURSE PRACTITIONER

## 2018-12-05 RX ORDER — PHENYTOIN SODIUM 100 MG/1
400 CAPSULE, EXTENDED RELEASE ORAL DAILY
Qty: 120 CAP | Refills: 5 | Status: SHIPPED | OUTPATIENT
Start: 2018-12-05 | End: 2019-04-25 | Stop reason: SDUPTHER

## 2018-12-05 RX ORDER — PHENOBARBITAL 32.4 MG/1
32.4 TABLET ORAL 3 TIMES DAILY
Qty: 90 TAB | Refills: 5 | Status: SHIPPED
Start: 2018-12-05 | End: 2019-04-25 | Stop reason: SDUPTHER

## 2018-12-05 ASSESSMENT — ENCOUNTER SYMPTOMS
ABDOMINAL PAIN: 0
SEIZURES: 1
COUGH: 0
DEPRESSION: 0
HEADACHES: 0
SORE THROAT: 0
CONSTITUTIONAL NEGATIVE: 1
DOUBLE VISION: 0
BACK PAIN: 1
NAUSEA: 0
VOMITING: 0
DIARRHEA: 0
NERVOUS/ANXIOUS: 0
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
## 2018-12-06 PROBLEM — Z91.89 AT RISK FOR OSTEOPOROSIS: Status: ACTIVE | Noted: 2018-12-06

## 2018-12-06 PROBLEM — R41.3 MEMORY DEFICIT: Status: ACTIVE | Noted: 2018-12-06

## 2019-01-18 ENCOUNTER — TELEPHONE (OUTPATIENT)
Dept: MEDICAL GROUP | Facility: MEDICAL CENTER | Age: 64
End: 2019-01-18

## 2019-01-18 NOTE — TELEPHONE ENCOUNTER
Left message with patient about no show to appointment yesterday 1/17/19.  Explained that this was his first no show and the no show policy.

## 2019-04-17 ENCOUNTER — NON-PROVIDER VISIT (OUTPATIENT)
Dept: NEUROLOGY | Facility: MEDICAL CENTER | Age: 64
End: 2019-04-17
Payer: MEDICAID

## 2019-04-17 DIAGNOSIS — G40.909 SEIZURE DISORDER (HCC): ICD-10-CM

## 2019-04-17 DIAGNOSIS — R41.3 MEMORY DEFICIT: ICD-10-CM

## 2019-04-17 PROCEDURE — 95816 EEG AWAKE AND DROWSY: CPT | Performed by: PSYCHIATRY & NEUROLOGY

## 2019-04-17 NOTE — PROCEDURES
ROUTINE ELECTROENCEPHALOGRAM REPORT      Referring provider: FUNMI Panda.     DOS:  4/17/2019 (total recording of 32 minutes)    INDICATION:  Gilbert Aldridge 63 y.o. male presenting with seizures.     CURRENT ANTIEPILEPTIC REGIMEN: Phenobarbital, Phenytoin.     TECHNIQUE: 30 channel routine electroencephalogram (EEG) was performed in accordance with the international 10-20 system. The study was reviewed in bipolar and referential montages. The recording examined the patient during wakeful and drowsy/sleep state(s).     DESCRIPTION OF THE RECORD:  During the wakefulness, the background showed a symmetrical 9 Hz alpha activity posteriorly with amplitude of 70 mV.  There was reactivity to eye closure/opening.  A normal anterior-posterior gradient was noted with faster beta frequencies seen anteriorly.  During drowsiness, theta/delta frequencies were seen.    During the sleep state, background shows diffuse high-amplitude 4-5 Hz delta activity.  Symmetrical high-amplitude sleep spindles and vertex sharps were seen in the leads over the central regions.     ACTIVATION PROCEDURES:   Hyperventilation was performed by the patient for a total of 3 minutes. The technician performing the test noted good effort. This technique failed to produce any significant electroencephalographic changes.     Intermittent Photic stimulation was performed in a stepwise fashion from 1 to 30 Hz and elicited a normal response (photic driving), most noticeable in the posterior leads.      ICTAL AND/OR INTERICTAL FINDINGS:   There is slowing and frequent sharps in the left anterior temporal region.  No clinical events or seizures were reported or recorded during the study.     EKG: sampling of the EKG recording demonstrated sinus rhythm.       INTERPRETATION:  This is an abnormal routine EEG recording in the awake, drowsy, and sleep state(s).  There is slowing and frequent sharps in the left anterior temporal region.  The findings  increase risk for seizures and suggest underlying area of cortical irritability and structural abnormality. No seizures captured during this study. Clinical and radiological correlation is recommended.    Rodriguez Augustin MD   Epilepsy and Neurodiagnostics.   Clinical  of Neurology [facility].   Diplomate in Neurology, Epilepsy, and Electrodiagnostic Medicine.   Office: [phone]  Fax: [phone]

## 2019-04-25 ENCOUNTER — OFFICE VISIT (OUTPATIENT)
Dept: MEDICAL GROUP | Facility: MEDICAL CENTER | Age: 64
End: 2019-04-25
Attending: FAMILY MEDICINE
Payer: MEDICAID

## 2019-04-25 VITALS
TEMPERATURE: 98 F | HEIGHT: 75 IN | WEIGHT: 172 LBS | RESPIRATION RATE: 16 BRPM | DIASTOLIC BLOOD PRESSURE: 60 MMHG | HEART RATE: 60 BPM | SYSTOLIC BLOOD PRESSURE: 122 MMHG | OXYGEN SATURATION: 96 % | BODY MASS INDEX: 21.39 KG/M2

## 2019-04-25 DIAGNOSIS — K21.9 GASTROESOPHAGEAL REFLUX DISEASE WITHOUT ESOPHAGITIS: ICD-10-CM

## 2019-04-25 DIAGNOSIS — E55.9 VITAMIN D DEFICIENCY: ICD-10-CM

## 2019-04-25 DIAGNOSIS — Z12.11 SCREENING FOR MALIGNANT NEOPLASM OF COLON: ICD-10-CM

## 2019-04-25 DIAGNOSIS — G40.909 SEIZURE DISORDER (HCC): ICD-10-CM

## 2019-04-25 PROCEDURE — 99214 OFFICE O/P EST MOD 30 MIN: CPT | Performed by: FAMILY MEDICINE

## 2019-04-25 PROCEDURE — 99213 OFFICE O/P EST LOW 20 MIN: CPT | Performed by: FAMILY MEDICINE

## 2019-04-25 RX ORDER — PHENYTOIN SODIUM 100 MG/1
400 CAPSULE, EXTENDED RELEASE ORAL DAILY
Qty: 120 CAP | Refills: 2 | Status: SHIPPED | OUTPATIENT
Start: 2019-04-25 | End: 2019-04-25 | Stop reason: SDUPTHER

## 2019-04-25 RX ORDER — PHENOBARBITAL 32.4 MG/1
32.4 TABLET ORAL 3 TIMES DAILY
Qty: 90 TAB | Refills: 5 | Status: SHIPPED | OUTPATIENT
Start: 2019-04-25 | End: 2019-10-07 | Stop reason: SDUPTHER

## 2019-04-25 RX ORDER — PHENYTOIN SODIUM 100 MG/1
400 CAPSULE, EXTENDED RELEASE ORAL DAILY
Qty: 120 CAP | Refills: 2 | Status: SHIPPED | OUTPATIENT
Start: 2019-04-25 | End: 2019-10-07 | Stop reason: SDUPTHER

## 2019-04-25 NOTE — PROGRESS NOTES
Chief Complaint   Patient presents with   • Follow-Up       HISTORY OF PRESENT ILLNESS: Patient is a 63 y.o. male established patient who presents today to transfer her primary care providers and discussed the problems below        Seizure disorder  Patient reports that he feels that he has fairly frequent very small brief almost absence type seizures without falling down losing muscular control or losing continence of bowel or bladder.  He has been compliant taking for phenytoin extended release tablets each morning along with 3 luminal 32.4 mg tablets each morning.  He is hesitant to discuss changing to any other antiseizure medications.  He notes loss of numerous teeth associated with taking his Dilantin medication.  He reports that he tends to not brushing or flossing his teeth either.  He is not driving.    Gastroesophageal reflux disease without esophagitis  Patient reports 1 year history of persistent epigastric pain and burning.  He has not had any black or bloody stools.  Normally does not have any emesis  Social history-single, not working    Patient Active Problem List    Diagnosis Date Noted   • Gastroesophageal reflux disease without esophagitis 04/25/2019   • At risk for osteoporosis 12/06/2018   • Memory deficit 12/06/2018   • Vitamin D deficiency 11/08/2017   • Routine health maintenance 10/09/2017   • Seizure disorder (HCC) 12/05/2014       Allergies:Nkda [no known drug allergy]    Current Outpatient Prescriptions   Medication Sig Dispense Refill   • PHENobarbital (LUMINAL) 32.4 MG Tab Take 1 Tab by mouth 3 times a day for 30 days. 90 Tab 5   • phenytoin ER (DILANTIN) 100 MG Cap Take 4 Caps by mouth every day. 120 Cap 2   • esomeprazole (NEXIUM 24HR) 20 MG capsule Take 1 Cap by mouth every morning before breakfast. 30 Cap 5   • ergocalciferol (DRISDOL) 29619 UNIT capsule Take 1 Cap by mouth every 7 days. 4 Cap 5     No current facility-administered medications for this visit.        Social History  "  Substance Use Topics   • Smoking status: Current Every Day Smoker     Years: 20.00     Types: Cigars   • Smokeless tobacco: Never Used      Comment: advise no tobacco products   • Alcohol use 0.0 oz/week      Comment: occasional beer       Family History   Problem Relation Age of Onset   • Heart Disease Maternal Grandmother    • GI Mother    • Stroke Paternal Grandmother    • Cancer Neg Hx    • Diabetes Neg Hx        ROS:  Review of Systems   Constitutional: Negative for fever, chills, weight loss and malaise/fatigue.   Eyes: Negative for blurred vision.   Respiratory: Negative for cough, sputum production, shortness of breath and wheezing.    Cardiovascular: Negative for chest pain, palpitations, orthopnea and leg swelling.   Gastrointestinal: Negative for heartburn, nausea, vomiting and abdominal pain.   Musculoskeletal: Positive for chronic lumbar pain  Endo/Heme/Allergies: Does not bruise/bleed easily.                Exam:  /60 (BP Location: Left arm, Patient Position: Sitting, BP Cuff Size: Adult)   Pulse 60   Temp 36.7 °C (98 °F) (Temporal)   Resp 16   Ht 1.905 m (6' 3\")   Wt 78 kg (172 lb)   SpO2 96%   General:  Well nourished, well developed male in NAD  Head is grossly normal.  Neck: Supple without JVD or bruit. Thyroid is not enlarged. Trachea is midline.  Pulmonary: Clear to ausculation .  Normal effort. No rales, ronchi, or wheezing.  Cardiovascular: Regular rate and rhythm without murmur.  Abdomen-Abdomen is soft, normal bowel sounds, no masses, guarding, ororganomegaly, slight epigastric tenderness  Lower extremities- neg for pretibial edema, redness, tenderness.      Please note that this dictation was created using voice recognition software. I have made every reasonable attempt to correct obvious errors, but I expect that there are errors of grammar and possibly content that I did not discover before finalizing the note.    Assessment/Plan:  1. Seizure disorder (HCC)  PHENobarbital " (LUMINAL) 32.4 MG Tab    DILANTIN    PHENOBARBITAL    Comp Metabolic Panel    CBC WITH DIFFERENTIAL    phenytoin ER (DILANTIN) 100 MG Cap    DISCONTINUED: phenytoin ER (DILANTIN) 100 MG Cap   2. Vitamin D deficiency     3. Screening for malignant neoplasm of colon     4. Gastroesophageal reflux disease without esophagitis       Plan: 1.  Patient agrees to obtain CMP, CBC, Dilantin level, phenobarbital level  2.  Renew current doses of phenobarbital and Dilantin extended release  3.  Trial of Nexium 24-hour OTC  4.  Follow-up with me in 2 months  5.  Asked patient to consider consenting to a neurology evaluation in the future.  6.  Patient is encouraged to remain completely abstinent from alcohol, now 2 months  7.  FIT packet sent

## 2019-04-25 NOTE — ASSESSMENT & PLAN NOTE
Patient reports that he feels that he has fairly frequent very small brief almost absence type seizures without falling down losing muscular control or losing continence of bowel or bladder.  He has been compliant taking for phenytoin extended release tablets each morning along with 3 luminal 32.4 mg tablets each morning.  He is hesitant to discuss changing to any other antiseizure medications.  He notes loss of numerous teeth associated with taking his Dilantin medication.  He reports that he tends to not brushing or flossing his teeth either.  He is not driving.

## 2019-04-25 NOTE — ASSESSMENT & PLAN NOTE
Patient reports 1 year history of persistent epigastric pain and burning.  He has not had any black or bloody stools.  Normally does not have any emesis

## 2019-04-30 ENCOUNTER — TELEPHONE (OUTPATIENT)
Dept: MEDICAL GROUP | Facility: MEDICAL CENTER | Age: 64
End: 2019-04-30

## 2019-04-30 NOTE — TELEPHONE ENCOUNTER
Continue with PA or Change Rx? Please advise. (Per pharm Ranitidine and omeprazole are covered by ins.)    MEDICATION PRIOR AUTHORIZATION NEEDED:    1. Name of Medication: Esomeprazole    2. [facility] By [facility]     3. Is insurance on file current? yes    4. What is the name & phone number of the 3rd party payor? [employer]

## 2019-05-01 NOTE — TELEPHONE ENCOUNTER
What is the exact version/specifications for omeprazole to get his [employer] to approve that medicine.  I ordered the Nexium because it did not appear that omeprazole would go through.  Dr. DUMONT

## 2019-05-10 NOTE — TELEPHONE ENCOUNTER
FINAL PRIOR AUTHORIZATION STATUS:    1.  Name of Medication & Dose: Omeprazole 20 mg      2. Prior Auth Status: Approved through 5/10/2020 PA [id number]     3. Action Taken: Pharmacy Notified: yes Patient Notified: yes

## 2019-06-03 ENCOUNTER — HOSPITAL ENCOUNTER (OUTPATIENT)
Facility: MEDICAL CENTER | Age: 64
End: 2019-06-03
Attending: FAMILY MEDICINE
Payer: MEDICAID

## 2019-06-03 PROCEDURE — 82274 ASSAY TEST FOR BLOOD FECAL: CPT

## 2019-06-05 ENCOUNTER — OFFICE VISIT (OUTPATIENT)
Dept: NEUROLOGY | Facility: MEDICAL CENTER | Age: 64
End: 2019-06-05
Payer: MEDICAID

## 2019-06-05 VITALS
OXYGEN SATURATION: 96 % | HEIGHT: 75 IN | BODY MASS INDEX: 20.76 KG/M2 | WEIGHT: 167 LBS | SYSTOLIC BLOOD PRESSURE: 102 MMHG | TEMPERATURE: 97 F | HEART RATE: 75 BPM | DIASTOLIC BLOOD PRESSURE: 66 MMHG

## 2019-06-05 DIAGNOSIS — E55.9 VITAMIN D DEFICIENCY: ICD-10-CM

## 2019-06-05 DIAGNOSIS — R41.3 MEMORY DEFICIT: ICD-10-CM

## 2019-06-05 DIAGNOSIS — Z91.89 AT RISK FOR OSTEOPOROSIS: ICD-10-CM

## 2019-06-05 DIAGNOSIS — G40.909 SEIZURE DISORDER (HCC): ICD-10-CM

## 2019-06-05 DIAGNOSIS — Z12.11 SCREEN FOR COLON CANCER: ICD-10-CM

## 2019-06-05 LAB
AMBIGUOUS DTTM AMBI4: NORMAL
HEMOCCULT STL QL IA: NEGATIVE

## 2019-06-05 PROCEDURE — 99213 OFFICE O/P EST LOW 20 MIN: CPT | Performed by: NURSE PRACTITIONER

## 2019-06-05 ASSESSMENT — ENCOUNTER SYMPTOMS
ABDOMINAL PAIN: 0
NAUSEA: 0
DIARRHEA: 0
NERVOUS/ANXIOUS: 0
HEADACHES: 0
DOUBLE VISION: 0
CONSTITUTIONAL NEGATIVE: 1
BACK PAIN: 1
SEIZURES: 0
SORE THROAT: 0
COUGH: 1
DEPRESSION: 0
VOMITING: 0

## 2019-06-05 NOTE — PROGRESS NOTES
Subjective:      Gilbert Aldridge is a 64 y.o. male who presents with Follow-Up (Seizure disorder )            HPI  Here today for a follow-up appointment.  No concerns at this time.  He is a vague conversationalist.    No longer has a 's license.    Of concern today is chronic back pain.  Asking if there is treatment for his back pain/discomfort.    4/2019 INTERPRETATION:  This is an abnormal routine EEG recording in the awake, drowsy, and sleep state(s).  There is slowing and frequent sharps in the left anterior temporal region. The findings increase risk for seizures and suggest underlying area of cortical irritability and structural abnormality. No seizures captured during this study. Clinical and radiological correlation is recommended.       Ref. Range 11/6/2017 13:21   25-Hydroxy   Vitamin D 25 Latest Ref Range: 30 - 100 ng/mL 10 (L)        Ref. Range 11/26/2018 13:56   Phenytoin Latest Ref Range: 10.0 - 20.0 ug/mL 6.6 (L)   Phenobarbital Latest Ref Range: 15.0 - 40.0 ug/mL 13.4 (L)     Vitamin D supplement  Current Outpatient Prescriptions   Medication Sig Dispense Refill   • phenytoin ER (DILANTIN) 100 MG Cap Take 4 Caps by mouth every day. 120 Cap 2   • esomeprazole (NEXIUM 24HR) 20 MG capsule Take 1 Cap by mouth every morning before breakfast. 30 Cap 5   • ergocalciferol (DRISDOL) 84117 UNIT capsule Take 1 Cap by mouth every 7 days. 4 Cap 5     No current facility-administered medications for this visit.          Review of Systems   Constitutional: Negative.    HENT: Positive for congestion. Negative for hearing loss, nosebleeds and sore throat.         No recent head injury.   Eyes: Negative for double vision.        No new loss of vision.   Respiratory: Positive for cough.         No recent lung infections.   Cardiovascular: Negative for chest pain.   Gastrointestinal: Negative for abdominal pain, diarrhea, nausea and vomiting.   Genitourinary: Negative.    Musculoskeletal: Positive for back  "pain.   Skin: Negative.    Neurological: Negative for seizures and headaches.   Endo/Heme/Allergies:        No history of endocrine dysfunction.  No new problems.   Psychiatric/Behavioral: Negative for depression. The patient is not nervous/anxious.         No recent mood changes.          Objective:     /66   Pulse 75   Temp 36.1 °C (97 °F) (Temporal)   Ht 1.905 m (6' 3\")   Wt 75.8 kg (167 lb)   SpO2 96%   BMI 20.87 kg/m²      Physical Exam   Constitutional: He is oriented to person, place, and time. He appears well-developed and well-nourished. No distress.   HENT:   Head: Normocephalic and atraumatic.   Nose: Nose normal.   No new hearing loss.   Eyes:   No double vision or loss of vision.   Neck: Normal range of motion.   Cardiovascular: Normal rate and regular rhythm.    No recent chest pain.   Pulmonary/Chest: Effort normal.   Abdominal: There is no tenderness.   Genitourinary:   Genitourinary Comments: No recent infections.   Musculoskeletal: He exhibits tenderness (mid thoracic pain).   Lymphadenopathy:     He has no cervical adenopathy.   Neurological: He is alert and oriented to person, place, and time. He has normal strength and normal reflexes. He displays no tremor. No cranial nerve deficit. He displays no seizure activity. Gait normal.   No observable changes in neurologic status.  See initial new patient examination for details.     Skin: Skin is warm and dry.   Psychiatric: His mood appears not anxious. He does not exhibit a depressed mood.   Loquacious, fragmented and repetitious at times.  Thoughts are based in the past.                 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  Events suggestive of temporal onset but no concern for seizures now in the past year or more.     Continue phenobarbital 32.4mg TID dosing and phenytoin ER 400mg qhs.     EEG and MRI results are reviewed.     Neurological examination is " significant for memory deficit.     Conversed regarding the AED's currently prescribed and usage, side effects and benefits of such.  The combination of AED's are not recommended however he has taken them for many years.     Reviewed the EEG results of which are abnormal.  I recommend the addition of a third AED with the goal of tapering off phenytoin first.  He is not amenable to this plan.    Continue phenytoin ER 400mg qhs and phenobarbital 32.4mg TID.     Continue Vit D supplement per PCP.  Recommend Calcium 1000mg each day.     Return for follow-up after EEG to discuss plan of care

## 2019-06-07 ENCOUNTER — TELEPHONE (OUTPATIENT)
Dept: MEDICAL GROUP | Facility: MEDICAL CENTER | Age: 64
End: 2019-06-07

## 2019-06-07 NOTE — TELEPHONE ENCOUNTER
Phone Number Called: [phone] (home)     Call outcome: left message for patient to call back regarding message below (1st attempt)    Message: Colon cancer check for hidden blood was negative.

## 2019-06-07 NOTE — TELEPHONE ENCOUNTER
----- Message from Rigo Vale M.D. sent at 6/6/2019 10:29 AM PDT -----  Colon cancer check for hidden blood was negative.

## 2019-06-10 NOTE — TELEPHONE ENCOUNTER
Phone Number Called: [phone] (home)     Call outcome: left message for patient to call back regarding message below (2nd attempt)    Message: Colon cancer check for hidden blood was negative.

## 2019-06-15 NOTE — TELEPHONE ENCOUNTER
Phone Number Called: [phone] (home)     Call outcome: left message for patient to call back regarding message below (final attempt)    Message: Colon cancer check for hidden blood was negative.

## 2019-06-28 ENCOUNTER — OFFICE VISIT (OUTPATIENT)
Dept: MEDICAL GROUP | Facility: MEDICAL CENTER | Age: 64
End: 2019-06-28
Attending: FAMILY MEDICINE
Payer: MEDICAID

## 2019-06-28 VITALS
WEIGHT: 170 LBS | TEMPERATURE: 97.9 F | RESPIRATION RATE: 16 BRPM | SYSTOLIC BLOOD PRESSURE: 110 MMHG | HEART RATE: 72 BPM | OXYGEN SATURATION: 96 % | BODY MASS INDEX: 21.14 KG/M2 | HEIGHT: 75 IN | DIASTOLIC BLOOD PRESSURE: 64 MMHG

## 2019-06-28 DIAGNOSIS — G40.909 SEIZURE DISORDER (HCC): ICD-10-CM

## 2019-06-28 PROCEDURE — 99213 OFFICE O/P EST LOW 20 MIN: CPT | Performed by: FAMILY MEDICINE

## 2019-06-28 NOTE — PROGRESS NOTES
"Subjective:      Gilbert Aldridge is a 64 y.o. male who presents with Seizure            HPI 1.  Seizure disorder-patient reports that he will have a rare brief absence type seizure without tonic-clonic activity or any urinary or fecal incontinence.  These might be occurring once per month or less frequently.  Patient does not drive.  He did have recent neurologic consultation.  That included an EEG which showed some abnormal wave forms.  Neurology would like to replace phenobarbital with a newer medication but patient is opposed to any change in his medication regimen at this time.  He reports he is not drinking any alcohol.    ROS negative for dyspnea, recent falls, tremor       Objective:     /64 (BP Location: Left arm, Patient Position: Sitting, BP Cuff Size: Adult)   Pulse 72   Temp 36.6 °C (97.9 °F) (Temporal)   Resp 16   Ht 1.905 m (6' 3\")   Wt 77.1 kg (170 lb)   SpO2 96%   BMI 21.25 kg/m²      Physical Exam  Gen.- alert, cooperative, in no acute distress  Neck- midline trachea, thyroid not enlarged or tender,supple, no cervical adenopathy  Chest-clear to auscultation and percussion with normal breath sounds. No retractions. Chest wall nontender  Cardiac- regular rhythm and rate. No murmur, thrill, or heave  Psych-normal affect with good eye contact. Normal grooming. Oriented x4.            Assessment/Plan:     1. Seizure disorder (HCC)    Plan: 1.  Patient is current on phenobarbital and Dilantin refills at this time  2.  Revisit in 4 months  5.  Follow-up with neurology as instructed    "

## 2019-10-07 ENCOUNTER — OFFICE VISIT (OUTPATIENT)
Dept: MEDICAL GROUP | Facility: MEDICAL CENTER | Age: 64
End: 2019-10-07
Attending: FAMILY MEDICINE
Payer: MEDICAID

## 2019-10-07 VITALS
OXYGEN SATURATION: 95 % | HEART RATE: 92 BPM | TEMPERATURE: 98.2 F | DIASTOLIC BLOOD PRESSURE: 58 MMHG | RESPIRATION RATE: 18 BRPM | HEIGHT: 75 IN | SYSTOLIC BLOOD PRESSURE: 116 MMHG | BODY MASS INDEX: 20.76 KG/M2 | WEIGHT: 167 LBS

## 2019-10-07 DIAGNOSIS — G40.909 SEIZURE DISORDER (HCC): ICD-10-CM

## 2019-10-07 PROCEDURE — 99213 OFFICE O/P EST LOW 20 MIN: CPT | Performed by: FAMILY MEDICINE

## 2019-10-07 RX ORDER — PHENOBARBITAL 32.4 MG/1
32.4 TABLET ORAL 3 TIMES DAILY
Qty: 90 TAB | Refills: 5 | Status: SHIPPED | OUTPATIENT
Start: 2019-10-07 | End: 2019-11-06

## 2019-10-07 RX ORDER — PHENYTOIN SODIUM 100 MG/1
400 CAPSULE, EXTENDED RELEASE ORAL DAILY
Qty: 120 CAP | Refills: 5 | Status: SHIPPED | OUTPATIENT
Start: 2019-10-07 | End: 2020-06-30

## 2019-10-07 NOTE — PROGRESS NOTES
"Subjective:      Matt Aldridge is a 64 y.o. male who presents with Seizure            HPI 1.  Seizure disorder-patient returns for scheduled follow-up visit.  He believes he may have had 3 very slight several second absence seizures in the past visit interval.  He said nothing where he lost consciousness fell to the ground or experience any type of incontinence.  He did have a consult with [facility] who wanted him to switch to an alternative to phenobarbital.  Patient in no uncertain terms refused to change his current regimen stating that it is been working very well for him for years.  He currently continues on Dilantin extended release 400 mg once a day along with phenobarb 32.4 mg TID    ROS negative for chest pain, chest pressure, altered vision       Objective:     /58 (BP Location: Left arm, Patient Position: Sitting, BP Cuff Size: Adult)   Pulse 92   Temp 36.8 °C (98.2 °F) (Temporal)   Resp 18   Ht 1.905 m (6' 3\")   Wt 75.8 kg (167 lb)   SpO2 95%   BMI 20.87 kg/m²      Physical Exam  Gen.- alert, cooperative, in no acute distress  Neck- midline trachea, thyroid not enlarged or tender,supple, no cervical adenopathy  Chest-clear to auscultation and percussion with normal breath sounds. No retractions. Chest wall nontender  Cardiac- regular rhythm and rate. No murmur, thrill, or heave  Neuro- intact light touch. Intact strength bilaterally. Normal gait. No tremor. Normal speech           Assessment/Plan:     1. Seizure disorder (HCC)    - PHENobarbital (LUMINAL) 32.4 MG Tab; Take 1 Tab by mouth 3 times a day for 30 days.  Dispense: 90 Tab; Refill: 5  - phenytoin ER (DILANTIN) 100 MG Cap; Take 4 Caps by mouth every day.  Dispense: 120 Cap; Refill: 5    Plan: 1.  Continue phenobarbital daily along with phenytoin extended release  2.  Follow-up with me in 3 months  "

## 2020-08-21 ENCOUNTER — OFFICE VISIT (OUTPATIENT)
Dept: MEDICAL GROUP | Facility: MEDICAL CENTER | Age: 65
End: 2020-08-21
Attending: PHYSICIAN ASSISTANT
Payer: MEDICAID

## 2020-08-21 VITALS
HEIGHT: 75 IN | HEART RATE: 71 BPM | BODY MASS INDEX: 20.76 KG/M2 | RESPIRATION RATE: 16 BRPM | TEMPERATURE: 97.5 F | DIASTOLIC BLOOD PRESSURE: 68 MMHG | WEIGHT: 167 LBS | SYSTOLIC BLOOD PRESSURE: 122 MMHG | OXYGEN SATURATION: 96 %

## 2020-08-21 DIAGNOSIS — G40.909 SEIZURE DISORDER (HCC): ICD-10-CM

## 2020-08-21 PROCEDURE — 99212 OFFICE O/P EST SF 10 MIN: CPT | Performed by: PHYSICIAN ASSISTANT

## 2020-08-21 PROCEDURE — 99213 OFFICE O/P EST LOW 20 MIN: CPT | Performed by: PHYSICIAN ASSISTANT

## 2020-08-21 RX ORDER — PHENYTOIN SODIUM 100 MG/1
CAPSULE, EXTENDED RELEASE ORAL
Qty: 120 CAP | Refills: 5 | Status: SHIPPED | OUTPATIENT
Start: 2020-08-21 | End: 2021-02-25 | Stop reason: SDUPTHER

## 2020-08-21 RX ORDER — PHENOBARBITAL 32.4 MG/1
32.4 TABLET ORAL 3 TIMES DAILY
Qty: 90 TAB | Refills: 5 | Status: SHIPPED | OUTPATIENT
Start: 2020-08-21 | End: 2021-02-25 | Stop reason: SDUPTHER

## 2020-08-21 RX ORDER — PHENOBARBITAL 32.4 MG/1
TABLET ORAL
COMMUNITY
Start: 2020-06-23 | End: 2020-08-21 | Stop reason: SDUPTHER

## 2020-08-21 ASSESSMENT — ENCOUNTER SYMPTOMS
WEIGHT LOSS: 0
CHILLS: 0
FEVER: 0

## 2020-08-21 NOTE — PROGRESS NOTES
Chief Complaint   Patient presents with   • Medication Refill   • Follow-Up       Subjective:     HPI:   Gilbert Aldridge is a 65 y.o. male here to discuss the evaluation and management of:    Seizure disorder  Matt presents today for medication refills.  His current medication regimen includes: Phenobarbital 32.4 mg 3 times daily and Phenytoin 100 mg 4 times daily.  He reports that he is tolerating these medications well without any concerns.  He states that he has experienced a couple seizures in the past few months but reports that they are far and few between since increasing his medication.  He has no concerns today.      ROS  Review of Systems   Constitutional: Negative for chills, fever, malaise/fatigue and weight loss.       Allergies   Allergen Reactions   • Nkda [No Known Drug Allergy]        Current medicines (including changes today)  Current Outpatient Medications   Medication Sig Dispense Refill   • PHENobarbital (LUMINAL) 32.4 MG Tab Take 1 Tab by mouth 3 times a day for 30 days. 90 Tab 5   • phenytoin ER (DILANTIN) 100 MG Cap TAKE 4 CAPSULES ONCE DAILY 120 Cap 5   • esomeprazole (NEXIUM 24HR) 20 MG capsule Take 1 Cap by mouth every morning before breakfast. 30 Cap 5   • ergocalciferol (DRISDOL) 03415 UNIT capsule Take 1 Cap by mouth every 7 days. 4 Cap 5     No current facility-administered medications for this visit.        Social History     Tobacco Use   • Smoking status: Current Every Day Smoker     Years: 20.00     Types: Cigars   • Smokeless tobacco: Never Used   • Tobacco comment: advise no tobacco products   Substance Use Topics   • Alcohol use: Yes     Alcohol/week: 0.0 oz     Comment: occasional beer   • Drug use: Yes     Types: Marijuana     Comment: occasional       Patient Active Problem List    Diagnosis Date Noted   • Gastroesophageal reflux disease without esophagitis 04/25/2019   • At risk for osteoporosis 12/06/2018   • Memory deficit 12/06/2018   • Vitamin D deficiency  "11/08/2017   • Routine health maintenance 10/09/2017   • Seizure disorder (HCC) 12/05/2014       Family History   Problem Relation Age of Onset   • Heart Disease Maternal Grandmother    • GI Disease Mother    • Stroke Paternal Grandmother    • Cancer Neg Hx    • Diabetes Neg Hx         Objective:     /68 (BP Location: Left arm, Patient Position: Sitting, BP Cuff Size: Adult long)   Pulse 71   Temp 36.4 °C (97.5 °F) (Temporal)   Resp 16   Ht 1.905 m (6' 3\")   Wt 75.8 kg (167 lb)   SpO2 96%  Body mass index is 20.87 kg/m².    Physical Exam:  Physical Exam   Constitutional: He is oriented to person, place, and time and well-developed, well-nourished, and in no distress.   HENT:   Head: Normocephalic.   Right Ear: External ear normal.   Left Ear: External ear normal.   Cardiovascular: Normal rate, regular rhythm and normal heart sounds.   Pulmonary/Chest: Effort normal and breath sounds normal.   Neurological: He is alert and oriented to person, place, and time. Gait normal.   Psychiatric: Affect and judgment normal.   Vitals reviewed.      Assessment and Plan:     The following treatment plan was discussed:    1. Seizure disorder (HCC)  - Continue with current medication regimen.  - PDMP reviewed.  - PHENobarbital (LUMINAL) 32.4 MG Tab; Take 1 Tab by mouth 3 times a day for 30 days.  Dispense: 90 Tab; Refill: 5  - Phenytoin ER (DILANTIN) 100 MG Cap; TAKE 4 CAPSULES ONCE DAILY  Dispense: 120 Cap; Refill: 5       Any change or worsening of signs or symptoms, patient encouraged to follow-up or report to emergency room for further evaluation. Patient verbalizes understanding and agrees.    Follow-Up: Return if symptoms worsen or fail to improve.      PLEASE NOTE: This dictation was created using voice recognition software. I have made every reasonable attempt to correct obvious errors, but I expect that there are errors of grammar and possibly content that I did not discover before finalizing the note.  "

## 2020-08-21 NOTE — ASSESSMENT & PLAN NOTE
Matt presents today for medication refills.  His current medication regimen includes: Phenobarbital 32.4 mg 3 times daily and Phenytoin 100 mg 4 times daily.  He reports that he is tolerating these medications well without any concerns.  He states that he has experienced a couple seizures in the past few months but reports that they are far and few between since increasing his medication.  He has no concerns today.

## 2020-08-26 ENCOUNTER — PHARMACY VISIT (OUTPATIENT)
Dept: PHARMACY | Facility: MEDICAL CENTER | Age: 65
End: 2020-08-26
Payer: COMMERCIAL

## 2020-08-26 PROCEDURE — RXMED WILLOW AMBULATORY MEDICATION CHARGE: Performed by: PHYSICIAN ASSISTANT

## 2020-09-23 PROCEDURE — RXMED WILLOW AMBULATORY MEDICATION CHARGE: Performed by: PHYSICIAN ASSISTANT

## 2020-09-24 PROCEDURE — RXMED WILLOW AMBULATORY MEDICATION CHARGE: Performed by: PHYSICIAN ASSISTANT

## 2020-09-25 ENCOUNTER — PHARMACY VISIT (OUTPATIENT)
Dept: PHARMACY | Facility: MEDICAL CENTER | Age: 65
End: 2020-09-25
Payer: COMMERCIAL

## 2020-10-23 PROCEDURE — RXMED WILLOW AMBULATORY MEDICATION CHARGE: Performed by: PHYSICIAN ASSISTANT

## 2020-10-26 ENCOUNTER — PHARMACY VISIT (OUTPATIENT)
Dept: PHARMACY | Facility: MEDICAL CENTER | Age: 65
End: 2020-10-26
Payer: COMMERCIAL

## 2020-11-23 PROCEDURE — RXMED WILLOW AMBULATORY MEDICATION CHARGE: Performed by: PHYSICIAN ASSISTANT

## 2020-11-24 ENCOUNTER — PHARMACY VISIT (OUTPATIENT)
Dept: PHARMACY | Facility: MEDICAL CENTER | Age: 65
End: 2020-11-24
Payer: COMMERCIAL

## 2020-12-31 ENCOUNTER — PHARMACY VISIT (OUTPATIENT)
Dept: PHARMACY | Facility: MEDICAL CENTER | Age: 65
End: 2020-12-31
Payer: COMMERCIAL

## 2020-12-31 PROCEDURE — RXMED WILLOW AMBULATORY MEDICATION CHARGE: Performed by: PHYSICIAN ASSISTANT

## 2021-01-28 PROCEDURE — RXMED WILLOW AMBULATORY MEDICATION CHARGE: Performed by: PHYSICIAN ASSISTANT

## 2021-01-29 ENCOUNTER — PHARMACY VISIT (OUTPATIENT)
Dept: PHARMACY | Facility: MEDICAL CENTER | Age: 66
End: 2021-01-29
Payer: COMMERCIAL

## 2021-02-25 DIAGNOSIS — G40.909 SEIZURE DISORDER (HCC): ICD-10-CM

## 2021-02-25 PROCEDURE — RXMED WILLOW AMBULATORY MEDICATION CHARGE: Performed by: FAMILY MEDICINE

## 2021-02-25 RX ORDER — PHENYTOIN SODIUM 100 MG/1
CAPSULE, EXTENDED RELEASE ORAL
Qty: 120 CAPSULE | Refills: 5 | Status: SHIPPED | OUTPATIENT
Start: 2021-02-25 | End: 2021-06-15 | Stop reason: SDUPTHER

## 2021-02-25 RX ORDER — PHENOBARBITAL 32.4 MG/1
32.4 TABLET ORAL 3 TIMES DAILY
Qty: 90 TABLET | Refills: 5 | Status: SHIPPED | OUTPATIENT
Start: 2021-02-25 | End: 2021-06-15 | Stop reason: SDUPTHER

## 2021-02-25 NOTE — TELEPHONE ENCOUNTER
Received request via: Pharmacy    Was the patient seen in the last year in this department? Yes    Does the patient have an active prescription (recently filled or refills available) for medication(s) requested? No   No appt scheduled at this time

## 2021-02-26 ENCOUNTER — PHARMACY VISIT (OUTPATIENT)
Dept: PHARMACY | Facility: MEDICAL CENTER | Age: 66
End: 2021-02-26
Payer: COMMERCIAL

## 2021-03-29 PROCEDURE — RXMED WILLOW AMBULATORY MEDICATION CHARGE: Performed by: FAMILY MEDICINE

## 2021-03-30 ENCOUNTER — PHARMACY VISIT (OUTPATIENT)
Dept: PHARMACY | Facility: MEDICAL CENTER | Age: 66
End: 2021-03-30
Payer: COMMERCIAL

## 2021-04-26 ENCOUNTER — PHARMACY VISIT (OUTPATIENT)
Dept: PHARMACY | Facility: MEDICAL CENTER | Age: 66
End: 2021-04-26
Payer: COMMERCIAL

## 2021-04-26 PROCEDURE — RXMED WILLOW AMBULATORY MEDICATION CHARGE: Performed by: FAMILY MEDICINE

## 2021-05-26 PROCEDURE — RXMED WILLOW AMBULATORY MEDICATION CHARGE: Performed by: FAMILY MEDICINE

## 2021-05-28 ENCOUNTER — PHARMACY VISIT (OUTPATIENT)
Dept: PHARMACY | Facility: MEDICAL CENTER | Age: 66
End: 2021-05-28
Payer: COMMERCIAL
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
## 2021-06-15 ENCOUNTER — OFFICE VISIT (OUTPATIENT)
Dept: NEUROLOGY | Facility: MEDICAL CENTER | Age: 66
End: 2021-06-15
Attending: PSYCHIATRY & NEUROLOGY
Payer: MEDICARE

## 2021-06-15 ENCOUNTER — HOSPITAL ENCOUNTER (OUTPATIENT)
Dept: LAB | Facility: MEDICAL CENTER | Age: 66
End: 2021-06-15
Attending: PSYCHIATRY & NEUROLOGY
Payer: MEDICARE

## 2021-06-15 VITALS
OXYGEN SATURATION: 97 % | RESPIRATION RATE: 16 BRPM | HEART RATE: 66 BPM | SYSTOLIC BLOOD PRESSURE: 124 MMHG | DIASTOLIC BLOOD PRESSURE: 70 MMHG | BODY MASS INDEX: 20.83 KG/M2 | HEIGHT: 75 IN | TEMPERATURE: 97.9 F | WEIGHT: 167.55 LBS

## 2021-06-15 DIAGNOSIS — R56.9 SEIZURE (HCC): Primary | ICD-10-CM

## 2021-06-15 DIAGNOSIS — G40.909 SEIZURE DISORDER (HCC): ICD-10-CM

## 2021-06-15 DIAGNOSIS — R56.9 SEIZURE (HCC): ICD-10-CM

## 2021-06-15 LAB
25(OH)D3 SERPL-MCNC: 26 NG/ML (ref 30–100)
ALBUMIN SERPL BCP-MCNC: 4.1 G/DL (ref 3.2–4.9)
ALBUMIN/GLOB SERPL: 1.5 G/DL
ALP SERPL-CCNC: 113 U/L (ref 30–99)
ALT SERPL-CCNC: 33 U/L (ref 2–50)
ANION GAP SERPL CALC-SCNC: 9 MMOL/L (ref 7–16)
AST SERPL-CCNC: 28 U/L (ref 12–45)
BASOPHILS # BLD AUTO: 0.4 % (ref 0–1.8)
BASOPHILS # BLD: 0.03 K/UL (ref 0–0.12)
BILIRUB SERPL-MCNC: 0.4 MG/DL (ref 0.1–1.5)
BUN SERPL-MCNC: 11 MG/DL (ref 8–22)
CALCIUM SERPL-MCNC: 8.9 MG/DL (ref 8.5–10.5)
CHLORIDE SERPL-SCNC: 103 MMOL/L (ref 96–112)
CO2 SERPL-SCNC: 25 MMOL/L (ref 20–33)
CREAT SERPL-MCNC: 0.66 MG/DL (ref 0.5–1.4)
EOSINOPHIL # BLD AUTO: 0 K/UL (ref 0–0.51)
EOSINOPHIL NFR BLD: 0 % (ref 0–6.9)
ERYTHROCYTE [DISTWIDTH] IN BLOOD BY AUTOMATED COUNT: 48.9 FL (ref 35.9–50)
GLOBULIN SER CALC-MCNC: 2.8 G/DL (ref 1.9–3.5)
GLUCOSE SERPL-MCNC: 88 MG/DL (ref 65–99)
HCT VFR BLD AUTO: 49 % (ref 42–52)
HGB BLD-MCNC: 15.8 G/DL (ref 14–18)
IMM GRANULOCYTES # BLD AUTO: 0.03 K/UL (ref 0–0.11)
IMM GRANULOCYTES NFR BLD AUTO: 0.4 % (ref 0–0.9)
LYMPHOCYTES # BLD AUTO: 1.91 K/UL (ref 1–4.8)
LYMPHOCYTES NFR BLD: 24 % (ref 22–41)
MCH RBC QN AUTO: 30.9 PG (ref 27–33)
MCHC RBC AUTO-ENTMCNC: 32.2 G/DL (ref 33.7–35.3)
MCV RBC AUTO: 95.7 FL (ref 81.4–97.8)
MONOCYTES # BLD AUTO: 0.49 K/UL (ref 0–0.85)
MONOCYTES NFR BLD AUTO: 6.2 % (ref 0–13.4)
NEUTROPHILS # BLD AUTO: 5.49 K/UL (ref 1.82–7.42)
NEUTROPHILS NFR BLD: 69 % (ref 44–72)
NRBC # BLD AUTO: 0 K/UL
NRBC BLD-RTO: 0 /100 WBC
PHENOBARB SERPL-MCNC: 15.4 UG/ML (ref 15–40)
PHENYTOIN SERPL-MCNC: 13.1 UG/ML (ref 10–20)
PLATELET # BLD AUTO: 277 K/UL (ref 164–446)
PMV BLD AUTO: 9.7 FL (ref 9–12.9)
POTASSIUM SERPL-SCNC: 4.7 MMOL/L (ref 3.6–5.5)
PROT SERPL-MCNC: 6.9 G/DL (ref 6–8.2)
RBC # BLD AUTO: 5.12 M/UL (ref 4.7–6.1)
SODIUM SERPL-SCNC: 137 MMOL/L (ref 135–145)
WBC # BLD AUTO: 8 K/UL (ref 4.8–10.8)

## 2021-06-15 PROCEDURE — 99211 OFF/OP EST MAY X REQ PHY/QHP: CPT | Performed by: PSYCHIATRY & NEUROLOGY

## 2021-06-15 PROCEDURE — 85025 COMPLETE CBC W/AUTO DIFF WBC: CPT

## 2021-06-15 PROCEDURE — 80053 COMPREHEN METABOLIC PANEL: CPT

## 2021-06-15 PROCEDURE — RXMED WILLOW AMBULATORY MEDICATION CHARGE: Performed by: PSYCHIATRY & NEUROLOGY

## 2021-06-15 PROCEDURE — 80185 ASSAY OF PHENYTOIN TOTAL: CPT

## 2021-06-15 PROCEDURE — 36415 COLL VENOUS BLD VENIPUNCTURE: CPT

## 2021-06-15 PROCEDURE — 99215 OFFICE O/P EST HI 40 MIN: CPT | Performed by: PSYCHIATRY & NEUROLOGY

## 2021-06-15 PROCEDURE — 80184 ASSAY OF PHENOBARBITAL: CPT

## 2021-06-15 PROCEDURE — 82306 VITAMIN D 25 HYDROXY: CPT

## 2021-06-15 RX ORDER — ERGOCALCIFEROL 1.25 MG/1
50000 CAPSULE ORAL
Qty: 4 CAPSULE | Refills: 5 | Status: SHIPPED | OUTPATIENT
Start: 2021-06-15 | End: 2021-12-15

## 2021-06-15 RX ORDER — PHENYTOIN SODIUM 100 MG/1
CAPSULE, EXTENDED RELEASE ORAL
Qty: 120 CAPSULE | Refills: 5 | Status: SHIPPED | OUTPATIENT
Start: 2021-06-15 | End: 2021-12-15 | Stop reason: SDUPTHER

## 2021-06-15 RX ORDER — PHENOBARBITAL 32.4 MG/1
32.4 TABLET ORAL 3 TIMES DAILY
Qty: 90 TABLET | Refills: 5 | Status: SHIPPED | OUTPATIENT
Start: 2021-06-15 | End: 2021-12-03 | Stop reason: SDUPTHER

## 2021-06-15 ASSESSMENT — PATIENT HEALTH QUESTIONNAIRE - PHQ9: CLINICAL INTERPRETATION OF PHQ2 SCORE: 0

## 2021-06-15 NOTE — PATIENT INSTRUCTIONS
Seizure precautions    Driving    Every State restricts driving in people with seizures. [place] requires that you be seizure free for 3 months from the date of your last seizure. The Department of Motor Vehicles (DMV), not the doctor, makes the decision on driving. Exceptions may be made for seizures that do not affect mental condition and ability to control a car, or that occur 100% during sleep.. We recommend:  § Do not drive if you are having seizures that would be dangerous on the road.  § Be honest with your doctor about your seizures, even if driving may be at risk  § Be honest with the DMV (use the driving form). It may protect you legally if problems later occur.    Seizure medicines and suicide    Seizure medicines have long been known to help some people with depression, but also to make others worse. The FDA recently took a look at their database of clinical studies of people taking epilepsy medicines. Their finding was 4 suicides in 27,863 patients taking epilepsy medicines, versus none in patients taking placebo (an inactive pill). They reported 105 people of the 27,863 who did not commit suicide, but had thoughts of suicide. Combined, the risk for suicidal thoughts and behavior was about 0.4% (1 in 250) for those taking epilepsy medications and 0.2% (1 in 500) for those given placebos.    This is new and important information because doctors and patients need to know the possible side effects of medicines. But it needs to be put in perspective and certainly is no reason for panic. The 4 suicides in 27,863 is a very small percentage, and it is impossible to be sure the epilepsy drugs were the cause. Depression occurs in about 10% or more of people with epilepsy, even independent of medications. We recommend the following.  § Do not stop your seizure medicine. It could be dangerous.  § If you have symptoms of depression, such as crying and low mood, please discuss them at your clinic visits, so a  decision can be made about whether antidepressant medication or referral to a psychotherapist would be useful.  § If you are thinking seriously about suicide, please call 911.  § For most people, this FDA warning is just something to know, but not a reason to change medicines.    Bone health    Several of the older antiseizure medications may cause thinning of bones with long-term use, leading to broken bones later in life. This is most problematic with phenytoin (Dilantin), but may occur with carbamazepine (Tegretol, Carbatrol), phenobarbital, primidone (Mysoline) and possibly valproate/valproic acid (Depakote, Depakene). This is a larger concern for women in mid-life or older, but it also can be an issue for men and younger women.   § This potential problem is not an emergency and occurs over months to years; do not stop your seizure medication without discussing your concerns with your doctor.   § Calcium, vitamin D3 supplementation and regular exercise may be helpful to maintain bone health.  § Periodic bone density screening may be helpful to show existence or progression of bone thinning.     Water Safety    You could drown during a seizure that occurs in water. Use the [employer] system for swimming. Let the tori know that you have seizures. Take showers instead of baths.    Burn safety    If you have uncontrolled seizures, be very careful around heat or flames. Cook on the back burner - you are less likely to lean on the burner or turn over the soup during a seizure. Don’t smoke, which is good advice for other reasons as well. Set the maximum house hot water temperature to 110 degrees Fahrenheit. Put guards on open fireplaces, wood stoves or radiators.     Heights    Occasional use of ladders and going up and down stairs is a reasonable risk. If your seizures are not in control, then do not work on ladders or unprotected heights for more than brief minutes.    Equipment and Power Tools    Cutting, chopping and  drilling equipment should have safety guards to avoid inadvertent injury; otherwise, do not use it if your seizures are not fully controlled. Do not use mowers lacking automatic stop switches or chain saws.     Safety    If you have uncontrolled seizures, do not carry your child in your arms, but use one of the slings/papooses. Change the baby on the floor. Do not bathe the baby in water deep enough for the mouth to be underwater. Breastfeeding is usually considered beneficial, even though small amounts of seizure medicines come out in the breast milk.    Fall Precautions      If you fall with some of your seizures, then fall-proof your environment. Put in carpets, cover sharp corners, and consider wearing a protective helmet in some circumstances.    Sudden Unexplained Death in Epilepsy (SUDEP)    It is rare for people to die from a seizure, but it can happen. One way is trauma or a car crash from a seizure. Another is the poorly understood condition called sudden unexplained death in epilepsy (SUDEP). We think this is most likely due to heart arrhythmias (irregular beats) caused by a seizure, but the mechanism is debated. For people with uncontrolled seizures we recommend:  § Do not suddenly stop your seizure medication, since this can be a risk factor for SUDEP.  § Do not be overly worried about SUDEP. It is tragic when it happens, but it is uncommon and there are currently no preventive measures, other than the best possible seizure control.    Medication Side Effects    To be approved for prescription use, seizure medicines must pass strict safety testing. Nevertheless, they all have side effects, some of which are potentially serious or even lethal. The risks of medications must be balanced against the risks of seizures. A full discussion of possible medicine side effects is not possible here, but we recommend:  § Know the main side effects of your seizure medicines. Your doctor is the best source  for individual information. Web sites such as epilepsyfoundation.org and epilepsy.com have good information.  § The package insert provided with your prescription lists full information on side effects, but most of these will never occur in an individual. Let the package insert inform you, but not scare you. Be aware that some side effects occur from drug interactions among all your medications. Interactions can involve prescription medicines, over-the-counter medicines, herbal remedies and even some foods. Grapefruit juice is an example of a seemingly benign food that can raise levels of carbamazepine or other drugs.  § Generic medications are less expensive, but may not produce the same blood levels as do brand name drugs or even other generics. Insurance plans and pharmacies sometimes switch to generics without patient or doctor approval. Be cautious if you are switching or being switched to generics. It may work out fine (and it often is a lot less expensive), but a blood test to check levels might be useful.    Recreation    If having a seizure during a recreational activity would cause you significant harm, then do not do the activity. Use common sense. Confer with your medical team for individual restrictions. As a general guideline for starting discussion with your medical team, we recommend:  § Low-risk recreation can be done by people with seizures, even if not in control. These include walking, running, bowling, golf, baseball, basketball, soccer, volleyball, swimming with the [employer] system, weight training with machines or spotters, elliptical trainers, treadmills with spotters. Confirm this with your medical care team.  § You should be able to go at least 3 months without a seizure to participate in medium-risk activities, but confirm this interval with your doctor. These include football, hockey, ice skating, bike racing, gymnastics, horseback riding and boating.  § You should be seizure free for more  than a year to perform high-risk activities, although some doctors recommend not engaging in high-risk recreational activities at all with a history of epilepsy. Ask yours if it is safe to engage in high-risk recreation. High-risk activities include hang gliding, motor sports, skiing, competitive skateboarding, mountain or rock climbing and SCUBA diving.

## 2021-06-16 PROCEDURE — RXMED WILLOW AMBULATORY MEDICATION CHARGE: Performed by: PSYCHIATRY & NEUROLOGY

## 2021-06-17 DIAGNOSIS — E55.9 VITAMIN D DEFICIENCY: Primary | ICD-10-CM

## 2021-06-17 PROCEDURE — RXMED WILLOW AMBULATORY MEDICATION CHARGE: Performed by: PSYCHIATRY & NEUROLOGY

## 2021-06-17 RX ORDER — ERGOCALCIFEROL 1.25 MG/1
50000 CAPSULE ORAL
Qty: 12 CAPSULE | Refills: 1 | Status: SHIPPED | OUTPATIENT
Start: 2021-06-17 | End: 2021-12-15 | Stop reason: SDUPTHER

## 2021-06-17 NOTE — PROGRESS NOTES
Called for blood work but unable to leave message.   Sent Vitamin D 50,000IU weekly to [facility] due to low VitD

## 2021-06-28 ENCOUNTER — PHARMACY VISIT (OUTPATIENT)
Dept: PHARMACY | Facility: MEDICAL CENTER | Age: 66
End: 2021-06-28
Payer: COMMERCIAL

## 2021-07-30 ENCOUNTER — PHARMACY VISIT (OUTPATIENT)
Dept: PHARMACY | Facility: MEDICAL CENTER | Age: 66
End: 2021-07-30
Payer: COMMERCIAL

## 2021-07-30 PROCEDURE — RXMED WILLOW AMBULATORY MEDICATION CHARGE: Performed by: PSYCHIATRY & NEUROLOGY

## 2021-08-02 PROCEDURE — RXMED WILLOW AMBULATORY MEDICATION CHARGE: Performed by: PSYCHIATRY & NEUROLOGY

## 2021-08-05 ENCOUNTER — PHARMACY VISIT (OUTPATIENT)
Dept: PHARMACY | Facility: MEDICAL CENTER | Age: 66
End: 2021-08-05
Payer: COMMERCIAL

## 2021-08-31 PROCEDURE — RXMED WILLOW AMBULATORY MEDICATION CHARGE: Performed by: PSYCHIATRY & NEUROLOGY

## 2021-09-01 ENCOUNTER — PHARMACY VISIT (OUTPATIENT)
Dept: PHARMACY | Facility: MEDICAL CENTER | Age: 66
End: 2021-09-01
Payer: COMMERCIAL

## 2021-09-07 PROCEDURE — RXMED WILLOW AMBULATORY MEDICATION CHARGE: Performed by: PSYCHIATRY & NEUROLOGY

## 2021-09-08 ENCOUNTER — PHARMACY VISIT (OUTPATIENT)
Dept: PHARMACY | Facility: MEDICAL CENTER | Age: 66
End: 2021-09-08
Payer: COMMERCIAL

## 2021-10-01 PROCEDURE — RXMED WILLOW AMBULATORY MEDICATION CHARGE: Performed by: PSYCHIATRY & NEUROLOGY

## 2021-10-04 ENCOUNTER — PHARMACY VISIT (OUTPATIENT)
Dept: PHARMACY | Facility: MEDICAL CENTER | Age: 66
End: 2021-10-04
Payer: COMMERCIAL

## 2021-11-02 PROCEDURE — RXMED WILLOW AMBULATORY MEDICATION CHARGE: Performed by: PSYCHIATRY & NEUROLOGY

## 2021-11-03 ENCOUNTER — PHARMACY VISIT (OUTPATIENT)
Dept: PHARMACY | Facility: MEDICAL CENTER | Age: 66
End: 2021-11-03
Payer: COMMERCIAL

## 2021-12-03 DIAGNOSIS — G40.909 SEIZURE DISORDER (HCC): ICD-10-CM

## 2021-12-03 PROCEDURE — RXMED WILLOW AMBULATORY MEDICATION CHARGE: Performed by: PSYCHIATRY & NEUROLOGY

## 2021-12-03 RX ORDER — PHENOBARBITAL 32.4 MG/1
32.4 TABLET ORAL 3 TIMES DAILY
Qty: 90 TABLET | Refills: 5 | Status: SHIPPED | OUTPATIENT
Start: 2021-12-03 | End: 2022-06-09 | Stop reason: SDUPTHER

## 2021-12-06 ENCOUNTER — PHARMACY VISIT (OUTPATIENT)
Dept: PHARMACY | Facility: MEDICAL CENTER | Age: 66
End: 2021-12-06
Payer: COMMERCIAL
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
## 2021-12-15 ENCOUNTER — OFFICE VISIT (OUTPATIENT)
Dept: NEUROLOGY | Facility: MEDICAL CENTER | Age: 66
End: 2021-12-15
Attending: PSYCHIATRY & NEUROLOGY
Payer: MEDICARE

## 2021-12-15 VITALS
RESPIRATION RATE: 14 BRPM | HEART RATE: 68 BPM | OXYGEN SATURATION: 95 % | HEIGHT: 75 IN | SYSTOLIC BLOOD PRESSURE: 116 MMHG | BODY MASS INDEX: 21 KG/M2 | WEIGHT: 168.87 LBS | DIASTOLIC BLOOD PRESSURE: 62 MMHG

## 2021-12-15 DIAGNOSIS — E55.9 VITAMIN D DEFICIENCY: ICD-10-CM

## 2021-12-15 DIAGNOSIS — G40.909 SEIZURE DISORDER (HCC): ICD-10-CM

## 2021-12-15 PROCEDURE — 99214 OFFICE O/P EST MOD 30 MIN: CPT | Performed by: PSYCHIATRY & NEUROLOGY

## 2021-12-15 PROCEDURE — 99211 OFF/OP EST MAY X REQ PHY/QHP: CPT | Performed by: PSYCHIATRY & NEUROLOGY

## 2021-12-15 PROCEDURE — RXMED WILLOW AMBULATORY MEDICATION CHARGE: Performed by: PSYCHIATRY & NEUROLOGY

## 2021-12-15 RX ORDER — PHENYTOIN SODIUM 100 MG/1
CAPSULE, EXTENDED RELEASE ORAL
Qty: 120 CAPSULE | Refills: 5 | Status: SHIPPED | OUTPATIENT
Start: 2021-12-15 | End: 2022-07-08 | Stop reason: SDUPTHER

## 2021-12-15 RX ORDER — ERGOCALCIFEROL 1.25 MG/1
50000 CAPSULE ORAL
Qty: 12 CAPSULE | Refills: 1 | Status: SHIPPED | OUTPATIENT
Start: 2021-12-15 | End: 2022-09-06 | Stop reason: SDUPTHER

## 2021-12-15 ASSESSMENT — FIBROSIS 4 INDEX: FIB4 SCORE: 1.14

## 2021-12-17 ENCOUNTER — PHARMACY VISIT (OUTPATIENT)
Dept: PHARMACY | Facility: MEDICAL CENTER | Age: 66
End: 2021-12-17
Payer: COMMERCIAL

## 2021-12-27 PROCEDURE — RXMED WILLOW AMBULATORY MEDICATION CHARGE: Performed by: PSYCHIATRY & NEUROLOGY

## 2022-01-04 PROCEDURE — RXMED WILLOW AMBULATORY MEDICATION CHARGE: Performed by: PSYCHIATRY & NEUROLOGY

## 2022-01-05 ENCOUNTER — PHARMACY VISIT (OUTPATIENT)
Dept: PHARMACY | Facility: MEDICAL CENTER | Age: 67
End: 2022-01-05
Payer: COMMERCIAL

## 2022-02-04 PROCEDURE — RXMED WILLOW AMBULATORY MEDICATION CHARGE: Performed by: PSYCHIATRY & NEUROLOGY

## 2022-02-07 ENCOUNTER — PHARMACY VISIT (OUTPATIENT)
Dept: PHARMACY | Facility: MEDICAL CENTER | Age: 67
End: 2022-02-07
Payer: COMMERCIAL

## 2022-03-08 PROCEDURE — RXMED WILLOW AMBULATORY MEDICATION CHARGE: Performed by: PSYCHIATRY & NEUROLOGY

## 2022-03-09 ENCOUNTER — PHARMACY VISIT (OUTPATIENT)
Dept: PHARMACY | Facility: MEDICAL CENTER | Age: 67
End: 2022-03-09
Payer: COMMERCIAL

## 2022-04-08 PROCEDURE — RXMED WILLOW AMBULATORY MEDICATION CHARGE: Performed by: PSYCHIATRY & NEUROLOGY

## 2022-04-12 ENCOUNTER — PHARMACY VISIT (OUTPATIENT)
Dept: PHARMACY | Facility: MEDICAL CENTER | Age: 67
End: 2022-04-12
Payer: COMMERCIAL

## 2022-05-09 PROCEDURE — RXMED WILLOW AMBULATORY MEDICATION CHARGE: Performed by: PSYCHIATRY & NEUROLOGY

## 2022-05-13 ENCOUNTER — PHARMACY VISIT (OUTPATIENT)
Dept: PHARMACY | Facility: MEDICAL CENTER | Age: 67
End: 2022-05-13
Payer: COMMERCIAL

## 2022-06-06 DIAGNOSIS — G40.909 SEIZURE DISORDER (HCC): ICD-10-CM

## 2022-06-06 PROCEDURE — RXMED WILLOW AMBULATORY MEDICATION CHARGE: Performed by: PSYCHIATRY & NEUROLOGY

## 2022-06-07 ENCOUNTER — TELEPHONE (OUTPATIENT)
Dept: SCHEDULING | Facility: IMAGING CENTER | Age: 67
End: 2022-06-07

## 2022-06-07 DIAGNOSIS — G40.909 SEIZURE DISORDER (HCC): ICD-10-CM

## 2022-06-07 RX ORDER — PHENOBARBITAL 32.4 MG/1
32.4 TABLET ORAL 3 TIMES DAILY
Qty: 90 TABLET | Refills: 5 | OUTPATIENT
Start: 2022-06-07 | End: 2022-12-05

## 2022-06-08 RX ORDER — PHENOBARBITAL 32.4 MG/1
32.4 TABLET ORAL 3 TIMES DAILY
Qty: 90 TABLET | Refills: 5 | OUTPATIENT
Start: 2022-06-08 | End: 2022-12-06

## 2022-06-08 NOTE — TELEPHONE ENCOUNTER
NOTE : Pt called and is asking for refills since he has only a few pills left, his appointment is until 06/29/2022      Received request via: Patient    Was the patient seen in the last year in this department? No    Does the patient have an active prescription (recently filled or refills available) for medication(s) requested? No

## 2022-06-09 ENCOUNTER — PHARMACY VISIT (OUTPATIENT)
Dept: PHARMACY | Facility: MEDICAL CENTER | Age: 67
End: 2022-06-09
Payer: COMMERCIAL

## 2022-06-09 DIAGNOSIS — G40.909 SEIZURE DISORDER (HCC): ICD-10-CM

## 2022-06-09 RX ORDER — PHENOBARBITAL 32.4 MG/1
32.4 TABLET ORAL 3 TIMES DAILY
Qty: 90 TABLET | Refills: 5 | Status: CANCELLED | OUTPATIENT
Start: 2022-06-09 | End: 2022-12-07

## 2022-06-09 RX ORDER — PHENOBARBITAL 32.4 MG/1
32.4 TABLET ORAL 3 TIMES DAILY
Qty: 90 TABLET | Refills: 0 | Status: SHIPPED | OUTPATIENT
Start: 2022-06-09 | End: 2022-07-08 | Stop reason: SDUPTHER

## 2022-06-10 ENCOUNTER — PHARMACY VISIT (OUTPATIENT)
Dept: PHARMACY | Facility: MEDICAL CENTER | Age: 67
End: 2022-06-10
Payer: COMMERCIAL

## 2022-06-10 PROCEDURE — RXMED WILLOW AMBULATORY MEDICATION CHARGE: Performed by: NURSE PRACTITIONER

## 2022-06-20 NOTE — TELEPHONE ENCOUNTER
Brand Name: omeprazole 20 mg capsule,delayed release  Generic Name:   Dosage/Strength: capsule,delayed release(DR/EC) 20 mg  Status: Formulary        Details: 1 capsule per 1 day(s).      It looks like even if it states formulary, we are required to send a PA    Medical Necessity Clause: This procedure was medically necessary because the lesions that were treated were:

## 2022-06-29 ENCOUNTER — APPOINTMENT (OUTPATIENT)
Dept: MEDICAL GROUP | Facility: PHYSICIAN GROUP | Age: 67
End: 2022-06-29
Payer: MEDICARE

## 2022-07-08 ENCOUNTER — OFFICE VISIT (OUTPATIENT)
Dept: MEDICAL GROUP | Facility: CLINIC | Age: 67
End: 2022-07-08
Payer: MEDICARE

## 2022-07-08 ENCOUNTER — PHARMACY VISIT (OUTPATIENT)
Dept: PHARMACY | Facility: MEDICAL CENTER | Age: 67
End: 2022-07-08
Payer: COMMERCIAL

## 2022-07-08 ENCOUNTER — APPOINTMENT (OUTPATIENT)
Dept: INTERNAL MEDICINE | Facility: OTHER | Age: 67
End: 2022-07-08
Payer: MEDICARE

## 2022-07-08 VITALS
BODY MASS INDEX: 20.92 KG/M2 | HEART RATE: 77 BPM | OXYGEN SATURATION: 91 % | RESPIRATION RATE: 12 BRPM | HEIGHT: 74 IN | SYSTOLIC BLOOD PRESSURE: 123 MMHG | WEIGHT: 163 LBS | DIASTOLIC BLOOD PRESSURE: 75 MMHG

## 2022-07-08 DIAGNOSIS — G40.909 SEIZURE DISORDER (HCC): ICD-10-CM

## 2022-07-08 DIAGNOSIS — Z12.11 SCREENING FOR COLON CANCER: ICD-10-CM

## 2022-07-08 PROCEDURE — RXMED WILLOW AMBULATORY MEDICATION CHARGE: Performed by: FAMILY MEDICINE

## 2022-07-08 PROCEDURE — 99213 OFFICE O/P EST LOW 20 MIN: CPT | Mod: GE | Performed by: STUDENT IN AN ORGANIZED HEALTH CARE EDUCATION/TRAINING PROGRAM

## 2022-07-08 RX ORDER — PHENOBARBITAL 32.4 MG/1
32.4 TABLET ORAL 3 TIMES DAILY
Qty: 90 TABLET | Refills: 0 | Status: SHIPPED | OUTPATIENT
Start: 2022-07-08 | End: 2022-08-03 | Stop reason: SDUPTHER

## 2022-07-08 RX ORDER — PHENYTOIN SODIUM 100 MG/1
CAPSULE, EXTENDED RELEASE ORAL
Qty: 120 CAPSULE | Refills: 5 | Status: SHIPPED | OUTPATIENT
Start: 2022-07-08 | End: 2022-09-06 | Stop reason: SDUPTHER

## 2022-07-08 ASSESSMENT — FIBROSIS 4 INDEX: FIB4 SCORE: 1.16

## 2022-07-08 NOTE — PROGRESS NOTES
"Subjective:     CC: Requesting medication refill    HPI:   Gilbert presents today with     History of epilepsy-  The patient reported that he fell as a child, was evaluated, and diagnosed with epilepsy as result.  He has been stable on Luminal and Dilantin.  He is requesting medication refills today.      Current Outpatient Medications Ordered in Epic   Medication Sig Dispense Refill   • PHENobarbital (LUMINAL) 32.4 MG Tab Take 1 Tablet by mouth 3 times a day for 30 days. 90 Tablet 0   • phenytoin ER (DILANTIN) 100 MG Cap TAKE 4 CAPSULES BY MOUTH ONCE DAILY 120 Capsule 5   • vitamin D2, Ergocalciferol, (DRISDOL) 1.25 MG (74689 UT) Cap capsule Take 1 Capsule by mouth every 7 days. 12 Capsule 1     No current Epic-ordered facility-administered medications on file.       Health Maintenance: Patient willing to undergo fit test screening for colon cancer.  Current tobacco user.  Current alcohol user.    ROS negative unless stated in HPI.    Objective:     Exam:  /75 (BP Location: Right arm, Patient Position: Sitting, BP Cuff Size: Adult)   Pulse 77   Resp 12   Ht 1.88 m (6' 2\")   Wt 73.9 kg (163 lb)   SpO2 91%   BMI 20.93 kg/m²  Body mass index is 20.93 kg/m².    Physical Exam  Constitutional:       Appearance: Normal appearance.   Cardiovascular:      Rate and Rhythm: Normal rate and regular rhythm.   Pulmonary:      Effort: Pulmonary effort is normal.      Breath sounds: Normal breath sounds.   Neurological:      Mental Status: He is alert.       Labs: No pertinent labs available.    Assessment & Plan:     67 y.o. male with the following -     #Epilepsy  - As per the patient's request, we will go ahead and refill the patient's current medications.    #Prevention/screening  - The patient was amicable to undergo FIT testing for colon cancer screening today, order placed  - DM patient is encouraged to return to the clinic for a wellness visit for additional screening and wellness management.    Return if " symptoms worsen or fail to improve; for wellness visit..

## 2022-08-03 ENCOUNTER — TELEPHONE (OUTPATIENT)
Dept: INTERNAL MEDICINE | Facility: OTHER | Age: 67
End: 2022-08-03
Payer: MEDICARE

## 2022-08-03 DIAGNOSIS — G40.909 SEIZURE DISORDER (HCC): ICD-10-CM

## 2022-08-03 PROCEDURE — RXMED WILLOW AMBULATORY MEDICATION CHARGE: Performed by: FAMILY MEDICINE

## 2022-08-03 RX ORDER — PHENOBARBITAL 32.4 MG/1
32.4 TABLET ORAL 3 TIMES DAILY
Qty: 90 TABLET | Refills: 0 | Status: SHIPPED | OUTPATIENT
Start: 2022-08-03 | End: 2022-09-01 | Stop reason: SDUPTHER

## 2022-08-04 ENCOUNTER — PHARMACY VISIT (OUTPATIENT)
Dept: PHARMACY | Facility: MEDICAL CENTER | Age: 67
End: 2022-08-04
Payer: COMMERCIAL

## 2022-09-01 DIAGNOSIS — G40.909 SEIZURE DISORDER (HCC): ICD-10-CM

## 2022-09-01 PROCEDURE — RXMED WILLOW AMBULATORY MEDICATION CHARGE: Performed by: FAMILY MEDICINE

## 2022-09-01 RX ORDER — PHENOBARBITAL 32.4 MG/1
32.4 TABLET ORAL 3 TIMES DAILY
Qty: 90 TABLET | Refills: 0 | Status: SHIPPED | OUTPATIENT
Start: 2022-09-01 | End: 2022-09-06 | Stop reason: SDUPTHER

## 2022-09-02 ENCOUNTER — PHARMACY VISIT (OUTPATIENT)
Dept: PHARMACY | Facility: MEDICAL CENTER | Age: 67
End: 2022-09-02
Payer: COMMERCIAL

## 2022-09-02 PROCEDURE — RXMED WILLOW AMBULATORY MEDICATION CHARGE: Performed by: STUDENT IN AN ORGANIZED HEALTH CARE EDUCATION/TRAINING PROGRAM

## 2022-09-06 DIAGNOSIS — E55.9 VITAMIN D DEFICIENCY: ICD-10-CM

## 2022-09-06 DIAGNOSIS — G40.909 SEIZURE DISORDER (HCC): ICD-10-CM

## 2022-09-08 RX ORDER — ERGOCALCIFEROL 1.25 MG/1
50000 CAPSULE ORAL
Qty: 12 CAPSULE | Refills: 1 | Status: SHIPPED | OUTPATIENT
Start: 2022-09-08

## 2022-09-08 RX ORDER — PHENYTOIN SODIUM 100 MG/1
CAPSULE, EXTENDED RELEASE ORAL
Qty: 120 CAPSULE | Refills: 5 | Status: SHIPPED | OUTPATIENT
Start: 2022-09-08 | End: 2023-01-11 | Stop reason: SDUPTHER

## 2022-09-08 RX ORDER — PHENOBARBITAL 32.4 MG/1
32.4 TABLET ORAL 3 TIMES DAILY
Qty: 90 TABLET | Refills: 0 | Status: SHIPPED | OUTPATIENT
Start: 2022-09-08 | End: 2022-11-28 | Stop reason: SDUPTHER

## 2022-09-26 PROCEDURE — RXMED WILLOW AMBULATORY MEDICATION CHARGE: Performed by: STUDENT IN AN ORGANIZED HEALTH CARE EDUCATION/TRAINING PROGRAM

## 2022-10-02 PROCEDURE — RXMED WILLOW AMBULATORY MEDICATION CHARGE: Performed by: STUDENT IN AN ORGANIZED HEALTH CARE EDUCATION/TRAINING PROGRAM

## 2022-10-04 ENCOUNTER — PHARMACY VISIT (OUTPATIENT)
Dept: PHARMACY | Facility: MEDICAL CENTER | Age: 67
End: 2022-10-04
Payer: COMMERCIAL

## 2022-10-12 ENCOUNTER — PHARMACY VISIT (OUTPATIENT)
Dept: PHARMACY | Facility: MEDICAL CENTER | Age: 67
End: 2022-10-12
Payer: COMMERCIAL

## 2022-10-12 PROCEDURE — RXMED WILLOW AMBULATORY MEDICATION CHARGE: Performed by: STUDENT IN AN ORGANIZED HEALTH CARE EDUCATION/TRAINING PROGRAM

## 2022-10-25 ENCOUNTER — PHARMACY VISIT (OUTPATIENT)
Dept: PHARMACY | Facility: MEDICAL CENTER | Age: 67
End: 2022-10-25
Payer: COMMERCIAL

## 2022-10-25 PROCEDURE — RXMED WILLOW AMBULATORY MEDICATION CHARGE: Performed by: STUDENT IN AN ORGANIZED HEALTH CARE EDUCATION/TRAINING PROGRAM

## 2022-11-06 PROCEDURE — RXMED WILLOW AMBULATORY MEDICATION CHARGE: Performed by: STUDENT IN AN ORGANIZED HEALTH CARE EDUCATION/TRAINING PROGRAM

## 2022-11-10 ENCOUNTER — PHARMACY VISIT (OUTPATIENT)
Dept: PHARMACY | Facility: MEDICAL CENTER | Age: 67
End: 2022-11-10
Payer: COMMERCIAL

## 2022-11-28 DIAGNOSIS — G40.909 SEIZURE DISORDER (HCC): ICD-10-CM

## 2022-11-28 PROCEDURE — RXMED WILLOW AMBULATORY MEDICATION CHARGE: Performed by: STUDENT IN AN ORGANIZED HEALTH CARE EDUCATION/TRAINING PROGRAM

## 2022-11-28 RX ORDER — PHENOBARBITAL 32.4 MG/1
32.4 TABLET ORAL 3 TIMES DAILY
Qty: 90 TABLET | Refills: 0 | Status: SHIPPED | OUTPATIENT
Start: 2022-11-28 | End: 2023-01-09 | Stop reason: SDUPTHER

## 2022-11-29 ENCOUNTER — PHARMACY VISIT (OUTPATIENT)
Dept: PHARMACY | Facility: MEDICAL CENTER | Age: 67
End: 2022-11-29
Payer: COMMERCIAL

## 2022-12-14 ENCOUNTER — PHARMACY VISIT (OUTPATIENT)
Dept: PHARMACY | Facility: MEDICAL CENTER | Age: 67
End: 2022-12-14
Payer: COMMERCIAL

## 2022-12-14 PROCEDURE — RXMED WILLOW AMBULATORY MEDICATION CHARGE: Performed by: STUDENT IN AN ORGANIZED HEALTH CARE EDUCATION/TRAINING PROGRAM

## 2023-01-09 DIAGNOSIS — G40.909 SEIZURE DISORDER (HCC): ICD-10-CM

## 2023-01-10 RX ORDER — PHENOBARBITAL 32.4 MG/1
32.4 TABLET ORAL 3 TIMES DAILY
Qty: 90 TABLET | Refills: 0 | Status: SHIPPED | OUTPATIENT
Start: 2023-01-10 | End: 2023-01-11 | Stop reason: SDUPTHER

## 2023-01-11 ENCOUNTER — OFFICE VISIT (OUTPATIENT)
Dept: MEDICAL GROUP | Facility: CLINIC | Age: 68
End: 2023-01-11
Payer: MEDICARE

## 2023-01-11 VITALS
TEMPERATURE: 99.2 F | HEART RATE: 97 BPM | DIASTOLIC BLOOD PRESSURE: 79 MMHG | WEIGHT: 169 LBS | RESPIRATION RATE: 16 BRPM | SYSTOLIC BLOOD PRESSURE: 120 MMHG | OXYGEN SATURATION: 97 % | HEIGHT: 75 IN | BODY MASS INDEX: 21.01 KG/M2

## 2023-01-11 DIAGNOSIS — G40.909 SEIZURE DISORDER (HCC): ICD-10-CM

## 2023-01-11 DIAGNOSIS — Z59.00 HOMELESS: ICD-10-CM

## 2023-01-11 PROCEDURE — 99213 OFFICE O/P EST LOW 20 MIN: CPT | Mod: GE

## 2023-01-11 PROCEDURE — RXMED WILLOW AMBULATORY MEDICATION CHARGE

## 2023-01-11 RX ORDER — PHENYTOIN SODIUM 100 MG/1
CAPSULE, EXTENDED RELEASE ORAL
Qty: 120 CAPSULE | Refills: 5 | Status: SHIPPED | OUTPATIENT
Start: 2023-01-11 | End: 2023-05-30 | Stop reason: SDUPTHER

## 2023-01-11 RX ORDER — PHENOBARBITAL 32.4 MG/1
32.4 TABLET ORAL 3 TIMES DAILY
Qty: 270 TABLET | Refills: 0 | Status: SHIPPED | OUTPATIENT
Start: 2023-01-11 | End: 2023-04-11

## 2023-01-11 SDOH — ECONOMIC STABILITY - HOUSING INSECURITY: HOMELESSNESS UNSPECIFIED: Z59.00

## 2023-01-11 ASSESSMENT — PATIENT HEALTH QUESTIONNAIRE - PHQ9: CLINICAL INTERPRETATION OF PHQ2 SCORE: 0

## 2023-01-11 ASSESSMENT — FIBROSIS 4 INDEX: FIB4 SCORE: 1.16

## 2023-01-11 NOTE — ASSESSMENT & PLAN NOTE
Longstanding, stable on regimen of Dilantin and phenobarbital  - Refilled Dilantin 100 mg 4 times daily-recommended to split tabs 2 tabs 2 times a day  - Refilled phenobarbital 32.4 mg 3 times daily  - Routine labs: CBC, CMP, folate, phenobarbital level.  -Referral to neurology as I am unsure if he is still established, last note just over a year ago, last labs 1 and half years ago.  -Follow-up in 3 months when refill requested.

## 2023-01-11 NOTE — PROGRESS NOTES
Subjective:     CC: Phenobarbital refill    HPI:   Gilbert presents today with request for phenobarbital refill.  Patient has had seizures his whole life, since he was 7 years old his sink.  He has been stable on his phenobarbital regimen of 32.4 mg 3 times a day and Dilantin 100 mg 4 times a day.  He sees neurology about once a year and gets blood test.  He has been out of his medications for a week.  His last seizure was approximately a month ago, while he was still on his medications.  He describes the seizure as a blacking out.  He does not drive due to his seizures.    Patient is homeless.  Is working with the homeless shelter and living there intermittently, amenable to social work referral today.  He does have a mail address, in the .    Social Hx:  Drug use:  marijuana every once in a while.  Last time he did meth: a year ago.  Heroin 2 years ago.  Cocaine: not for the last year.  States he is quit all drugs.  Alcohol: quit  Working: Tobacco-cigars    Problem   Homeless   Seizure Disorder (Hcc)       Current Outpatient Medications Ordered in Epic   Medication Sig Dispense Refill    PHENobarbital (LUMINAL) 32.4 MG Tab Take 1 Tablet by mouth 3 times a day for 90 days. 270 Tablet 0    phenytoin ER (DILANTIN) 100 MG Cap Take 2 capsules by mouth twice a day 120 Capsule 5    vitamin D2, Ergocalciferol, (DRISDOL) 1.25 MG (16072 UT) Cap capsule Take 1 Capsule by mouth every 7 days. 12 Capsule 1     No current Epic-ordered facility-administered medications on file.       Past Medical History:   Diagnosis Date    Seizure (HCC)     petit mal, onset childhood        Past Surgical History:   Procedure Laterality Date    APPENDECTOMY          Family History   Problem Relation Age of Onset    Heart Disease Maternal Grandmother     GI Disease Mother     Stroke Paternal Grandmother     Cancer Neg Hx     Diabetes Neg Hx         Objective:     Exam:  /79 (BP Location: Left arm, Patient Position: Sitting, BP Cuff  "Size: Large adult)   Pulse 97   Temp 37.3 °C (99.2 °F) (Temporal)   Resp 16   Ht 1.905 m (6' 3\")   Wt 76.7 kg (169 lb)   SpO2 97%   BMI 21.12 kg/m²  Body mass index is 21.12 kg/m².    Gen: Alert and oriented, No apparent distress.  Head:  +gum hypertrophy/recession, top dentures.  NCAT, EOMI, sclera clear without discharge  Neck: Neck is supple without lymphadenopathy.  Lungs: Normal effort, CTA bilaterally, no wheezes, rhonchi, or rales  CV: Regular rate and rhythm. No murmurs, rubs, or gallops.  Ext: No clubbing, cyanosis, edema.  MSK: Unassisted gait  Psych: Circumferential speaking, moderately poor historian.        Assessment & Plan:     67 y.o. male with the following -     Problem List Items Addressed This Visit       Seizure disorder (HCC)     Longstanding, stable on regimen of Dilantin and phenobarbital  - Refilled Dilantin 100 mg 4 times daily-recommended to split tabs 2 tabs 2 times a day  - Refilled phenobarbital 32.4 mg 3 times daily  - Routine labs: CBC, CMP, folate, phenobarbital level.  -Referral to neurology as I am unsure if he is still established, last note just over a year ago, last labs 1 and half years ago.  -Follow-up in 3 months when refill requested.         Relevant Medications    PHENobarbital (LUMINAL) 32.4 MG Tab    phenytoin ER (DILANTIN) 100 MG Cap    Other Relevant Orders    REFERRAL TO CARE MANAGEMENT    Referral to Neurology    CBC WITH DIFFERENTIAL    Comp Metabolic Panel    FOLATE    HEP C VIRUS ANTIBODY    HIV AG/AB COMBO ASSAY SCREENING    RPR (SYPHILIS)    PHENOBARBITAL    Homeless     Patient intermittently living in homeless shelter  History of been present 3 times  Patient talks about finding when he has old residence.  - Social work referral  - Routine STD test.            Follow-up: 3 months, when refills of Dilantin and phenobarbital needed, and for lab review.        "

## 2023-01-11 NOTE — ASSESSMENT & PLAN NOTE
Patient intermittently living in homeless shelter  History of been present 3 times  Patient talks about finding when he has old residence.  - Social work referral  - Routine STD test.

## 2023-01-18 ENCOUNTER — PHARMACY VISIT (OUTPATIENT)
Dept: PHARMACY | Facility: MEDICAL CENTER | Age: 68
End: 2023-01-18
Payer: COMMERCIAL

## 2023-02-28 ENCOUNTER — TELEPHONE (OUTPATIENT)
Dept: MEDICAL GROUP | Facility: CLINIC | Age: 68
End: 2023-02-28
Payer: MEDICARE

## 2023-02-28 DIAGNOSIS — G40.909 SEIZURE DISORDER (HCC): ICD-10-CM

## 2023-02-28 RX ORDER — PHENOBARBITAL 32.4 MG/1
32.4 TABLET ORAL 3 TIMES DAILY
Qty: 270 TABLET | Refills: 0 | Status: CANCELLED | OUTPATIENT
Start: 2023-02-28 | End: 2023-05-29

## 2023-02-28 NOTE — TELEPHONE ENCOUNTER
Provider asked MSW intern to call patient to provide assistance in community resources. MSW intern called the the patient's phone numbers three times, all calls went to voicemail.  MSW intern let provider know that they were unable to get into contact with the patient and provider decided that if MSW intern is in office at the patient's next appointment, they will speak to him directly.

## 2023-03-06 PROCEDURE — RXMED WILLOW AMBULATORY MEDICATION CHARGE

## 2023-03-09 ENCOUNTER — TELEPHONE (OUTPATIENT)
Dept: MEDICAL GROUP | Facility: CLINIC | Age: 68
End: 2023-03-09

## 2023-03-09 NOTE — TELEPHONE ENCOUNTER
Dr. Clifton called patient and left message that medication is not due to be refilled until April per your note. Thank you, Amanda

## 2023-03-10 ENCOUNTER — PHARMACY VISIT (OUTPATIENT)
Dept: PHARMACY | Facility: MEDICAL CENTER | Age: 68
End: 2023-03-10
Payer: COMMERCIAL

## 2023-03-16 ENCOUNTER — OFFICE VISIT (OUTPATIENT)
Dept: MEDICAL GROUP | Facility: CLINIC | Age: 68
End: 2023-03-16
Payer: MEDICARE

## 2023-03-16 VITALS
TEMPERATURE: 98.7 F | SYSTOLIC BLOOD PRESSURE: 108 MMHG | RESPIRATION RATE: 20 BRPM | OXYGEN SATURATION: 93 % | BODY MASS INDEX: 21.3 KG/M2 | WEIGHT: 166 LBS | HEART RATE: 72 BPM | DIASTOLIC BLOOD PRESSURE: 74 MMHG | HEIGHT: 74 IN

## 2023-03-16 DIAGNOSIS — G40.909 SEIZURE DISORDER (HCC): ICD-10-CM

## 2023-03-16 PROCEDURE — 99213 OFFICE O/P EST LOW 20 MIN: CPT | Mod: GE | Performed by: STUDENT IN AN ORGANIZED HEALTH CARE EDUCATION/TRAINING PROGRAM

## 2023-03-16 RX ORDER — PHENOBARBITAL 32.4 MG/1
32.4 TABLET ORAL 3 TIMES DAILY
Qty: 90 TABLET | Refills: 0 | Status: SHIPPED | OUTPATIENT
Start: 2023-03-16 | End: 2023-11-17 | Stop reason: SDUPTHER

## 2023-03-16 ASSESSMENT — FIBROSIS 4 INDEX: FIB4 SCORE: 1.16

## 2023-04-07 ENCOUNTER — PHARMACY VISIT (OUTPATIENT)
Dept: PHARMACY | Facility: MEDICAL CENTER | Age: 68
End: 2023-04-07
Payer: COMMERCIAL

## 2023-04-07 PROCEDURE — RXMED WILLOW AMBULATORY MEDICATION CHARGE

## 2023-05-05 ENCOUNTER — PHARMACY VISIT (OUTPATIENT)
Dept: PHARMACY | Facility: MEDICAL CENTER | Age: 68
End: 2023-05-05
Payer: COMMERCIAL

## 2023-05-05 PROCEDURE — RXMED WILLOW AMBULATORY MEDICATION CHARGE: Performed by: STUDENT IN AN ORGANIZED HEALTH CARE EDUCATION/TRAINING PROGRAM

## 2023-05-05 PROCEDURE — RXMED WILLOW AMBULATORY MEDICATION CHARGE

## 2023-05-30 ENCOUNTER — OFFICE VISIT (OUTPATIENT)
Dept: MEDICAL GROUP | Facility: CLINIC | Age: 68
End: 2023-05-30
Payer: MEDICARE

## 2023-05-30 VITALS
DIASTOLIC BLOOD PRESSURE: 60 MMHG | OXYGEN SATURATION: 95 % | TEMPERATURE: 98.7 F | SYSTOLIC BLOOD PRESSURE: 110 MMHG | HEART RATE: 74 BPM | BODY MASS INDEX: 20.7 KG/M2 | WEIGHT: 166.44 LBS | HEIGHT: 75 IN

## 2023-05-30 DIAGNOSIS — G40.909 SEIZURE DISORDER (HCC): ICD-10-CM

## 2023-05-30 PROCEDURE — RXMED WILLOW AMBULATORY MEDICATION CHARGE: Performed by: STUDENT IN AN ORGANIZED HEALTH CARE EDUCATION/TRAINING PROGRAM

## 2023-05-30 PROCEDURE — 3078F DIAST BP <80 MM HG: CPT | Performed by: STUDENT IN AN ORGANIZED HEALTH CARE EDUCATION/TRAINING PROGRAM

## 2023-05-30 PROCEDURE — 3074F SYST BP LT 130 MM HG: CPT | Performed by: STUDENT IN AN ORGANIZED HEALTH CARE EDUCATION/TRAINING PROGRAM

## 2023-05-30 PROCEDURE — 99213 OFFICE O/P EST LOW 20 MIN: CPT | Mod: GE | Performed by: STUDENT IN AN ORGANIZED HEALTH CARE EDUCATION/TRAINING PROGRAM

## 2023-05-30 RX ORDER — PHENOBARBITAL 32.4 MG/1
32.4 TABLET ORAL 3 TIMES DAILY
Qty: 90 TABLET | Refills: 0 | Status: SHIPPED | OUTPATIENT
Start: 2023-06-05 | End: 2023-07-07 | Stop reason: SDUPTHER

## 2023-05-30 RX ORDER — PHENYTOIN SODIUM 100 MG/1
CAPSULE, EXTENDED RELEASE ORAL
Qty: 120 CAPSULE | Refills: 2 | Status: SHIPPED | OUTPATIENT
Start: 2023-05-30 | End: 2023-07-07 | Stop reason: SDUPTHER

## 2023-05-30 ASSESSMENT — FIBROSIS 4 INDEX: FIB4 SCORE: 1.18

## 2023-05-30 NOTE — PROGRESS NOTES
"Subjective:     CC: seizures    HPI:   Gilbert presents today with follow-up on seizures.     On chart review, lifelong history of seizures, last seen by Neurology in Dec 2021 (prior to that last seen in 2019, consultation in 2018). Good history in note from June 2021.    Currently prescribed phenobarbital 32.4 mg TID, however, he is taking all three tablets once in the morning. He is also prescribed Dilantin 200 mg BID, however, he is taking all 400 mg once in the morning.    On chart review, has been on phenobarbital since prior to 2018. Was on Dilantin at least since 2021.     He notes last seizure was \"maybe a few weeks ago\" but it was \"not a bad one, not a grand mal.\"     He does not like living at the shelter, he notes the shelter does not feel comfortable with him having phenobarbital.    No problems updated.    Current Outpatient Medications Ordered in Epic   Medication Sig Dispense Refill    PHENobarbital (LUMINAL) 32.4 MG Tab Take 1 Tablet by mouth 3 times a day for 30 days. (Patient taking differently: Take 32.4 mg by mouth 4 times a day.) 90 Tablet 0    phenytoin ER (DILANTIN) 100 MG Cap Take 2 capsules by mouth twice a day 120 Capsule 5    vitamin D2, Ergocalciferol, (DRISDOL) 1.25 MG (86580 UT) Cap capsule Take 1 Capsule by mouth every 7 days. 12 Capsule 1     No current Epic-ordered facility-administered medications on file.       ROS:  Gen: no fevers, no chills  Eyes: no vision changes  ENT: no sore throat, no bloody nose  Pulm: no sob, no cough  CV: no chest pain, no palpitations  GI: no vomiting, no diarrhea  : no urinary changes  Skin: no rash  Neuro: no headaches, no numbness      Objective:     Exam:  /60 (BP Location: Right arm, Patient Position: Sitting, BP Cuff Size: Adult)   Pulse 74   Temp 37.1 °C (98.7 °F) (Temporal)   Ht 1.905 m (6' 3\")   Wt 75.5 kg (166 lb 7 oz)   SpO2 95%   BMI 20.80 kg/m²  Body mass index is 20.8 kg/m².    Gen: Alert and oriented, No apparent distress. "   Neck: Full range of motion, no lymphadenopathy  Lungs: Normal effort, no wheezing or rales  CV: Regular rate and rhythm. No murmurs  Ext: Moving all extremities, 2+ radial pulses bilaterally    Assessment & Plan:     68 y.o. male with the following -     Problem List Items Addressed This Visit       Seizure disorder (HCC)     # seizures  Currently prescribed phenobarbital 32.4 mg TID, however, he is taking all three tablets once in the morning. Also prescribed phenytoin 200 mg BID, but he is taking all 400 mg in the morning. He is a vague historian, he notes he may have had a seizure a few weeks ago, but on the whole he feels this medication regimen is working well for him.   - recommend taking phenobarbital TID (as opposed to all at once)  ---> Rx today, to start 6/5/23  - recommend taking phenytoin 200 mg BID (as opposed to all at once)  - recommend checking in with Neurology, as last time he saw them was 2021 and 6 month follow-up was recommended  ---> referral has been processed, gave number for patient to call to schedule appointment  - he does not drive and we discussed other seizure precautions  - follow-up 1 month

## 2023-06-05 ENCOUNTER — PHARMACY VISIT (OUTPATIENT)
Dept: PHARMACY | Facility: MEDICAL CENTER | Age: 68
End: 2023-06-05
Payer: COMMERCIAL

## 2023-06-05 PROCEDURE — RXMED WILLOW AMBULATORY MEDICATION CHARGE: Performed by: STUDENT IN AN ORGANIZED HEALTH CARE EDUCATION/TRAINING PROGRAM

## 2023-07-07 ENCOUNTER — PHARMACY VISIT (OUTPATIENT)
Dept: PHARMACY | Facility: MEDICAL CENTER | Age: 68
End: 2023-07-07
Payer: COMMERCIAL

## 2023-07-07 ENCOUNTER — OFFICE VISIT (OUTPATIENT)
Dept: MEDICAL GROUP | Facility: CLINIC | Age: 68
End: 2023-07-07
Payer: MEDICARE

## 2023-07-07 DIAGNOSIS — Z59.41 FOOD INSECURITY: ICD-10-CM

## 2023-07-07 DIAGNOSIS — Z12.11 COLON CANCER SCREENING: ICD-10-CM

## 2023-07-07 DIAGNOSIS — G40.909 SEIZURE DISORDER (HCC): ICD-10-CM

## 2023-07-07 DIAGNOSIS — Z00.00 ROUTINE HEALTH MAINTENANCE: ICD-10-CM

## 2023-07-07 PROCEDURE — 99213 OFFICE O/P EST LOW 20 MIN: CPT | Mod: GE | Performed by: STUDENT IN AN ORGANIZED HEALTH CARE EDUCATION/TRAINING PROGRAM

## 2023-07-07 PROCEDURE — RXMED WILLOW AMBULATORY MEDICATION CHARGE: Performed by: STUDENT IN AN ORGANIZED HEALTH CARE EDUCATION/TRAINING PROGRAM

## 2023-07-07 RX ORDER — PHENYTOIN SODIUM 100 MG/1
CAPSULE, EXTENDED RELEASE ORAL
Qty: 120 CAPSULE | Refills: 2 | Status: SHIPPED | OUTPATIENT
Start: 2023-07-07 | End: 2023-08-08 | Stop reason: SDUPTHER

## 2023-07-07 RX ORDER — PHENOBARBITAL 32.4 MG/1
32.4 TABLET ORAL 3 TIMES DAILY
Qty: 90 TABLET | Refills: 0 | Status: CANCELLED | OUTPATIENT
Start: 2023-07-07 | End: 2023-08-06

## 2023-07-07 RX ORDER — PHENOBARBITAL 32.4 MG/1
32.4 TABLET ORAL 3 TIMES DAILY
Qty: 90 TABLET | Refills: 0 | Status: SHIPPED | OUTPATIENT
Start: 2023-07-07 | End: 2023-08-08 | Stop reason: SDUPTHER

## 2023-07-07 SDOH — ECONOMIC STABILITY - FOOD INSECURITY: FOOD INSECURITY: Z59.41

## 2023-07-07 NOTE — ASSESSMENT & PLAN NOTE
- Patient refilled at max dose of Dilantin, 200 mg twice daily.  - Patient encouraged to attend his upcoming appointment with the neurologist on 7/24/2023.  - Dilantin lab ordered to assess level.

## 2023-07-07 NOTE — ASSESSMENT & PLAN NOTE
-The patient declined a colonoscopy referral today.  Patient alternatively provided Cologuard.  -We will discuss tobacco cessation in a future appointment.

## 2023-07-07 NOTE — PROGRESS NOTES
"Subjective:     CC: [facility] refill of Dilantin    HPI:   Gilbert presents today with     Problem   Food Insecurity    The patient reported that he is a beneficiary of food stamps, however it is not sufficient.     Routine Health Maintenance    He reported not ever undergoing colon cancer screening.  He reports smoking at least a pack a day of mini cigars.     Seizure disorder (HCC)    The patient has a longstanding history of seizures since childhood. He reported having a seizure yesterday while sitting on a milk cart.  He said he woke up on the ground. He denied head trauma. He denies any headache at this time.  He denies any prodrome to his seizure.  He says he is unable to predict when he can have a seizure.  He has had a seizure once every month or so.  He does not drive at this time.  He either walks or takes a bus to get around town.  He reports having a \"nasty cold\" over the last 2 weeks.  He denies any other changes in his medications.  He reports being under chronic stress as he is only recently secured housing.  He reports needing additional resources as his current food stamp allocation is insufficient.            Current Outpatient Medications Ordered in Epic   Medication Sig Dispense Refill    phenytoin ER (DILANTIN) 100 MG Cap Take 2 capsules by mouth twice a day 120 Capsule 2    vitamin D2, Ergocalciferol, (DRISDOL) 1.25 MG (75142 UT) Cap capsule Take 1 Capsule by mouth every 7 days. 12 Capsule 1     No current Epic-ordered facility-administered medications on file.       ROS negative unless stated in HPI.    Objective:     Exam:  BP (P) 118/78 (BP Location: Right arm, Patient Position: Sitting, BP Cuff Size: Adult)   Pulse (P) 62   Resp (P) 17   Ht (P) 1.829 m (6')   Wt (P) 73 kg (161 lb)   SpO2 (P) 93%   BMI (P) 21.84 kg/m²  Body mass index is 21.84 kg/m² (pended).    Physical Exam  Constitutional:       Appearance: Normal appearance. He is normal weight.   Cardiovascular:      Rate and " Rhythm: Normal rate and regular rhythm.      Heart sounds: Normal heart sounds. No murmur heard.  Pulmonary:      Effort: Pulmonary effort is normal. No respiratory distress.      Breath sounds: Normal breath sounds.   Abdominal:      General: Bowel sounds are normal.      Palpations: Abdomen is soft.   Skin:     General: Skin is warm and dry.      Capillary Refill: Capillary refill takes less than 2 seconds.   Neurological:      Mental Status: He is alert.       Labs:   Results for orders placed or performed during the hospital encounter of 06/15/21   PHENOBARBITAL   Result Value Ref Range    Phenobarbital 15.4 15.0 - 40.0 ug/mL   Comp Metabolic Panel   Result Value Ref Range    Sodium 137 135 - 145 mmol/L    Potassium 4.7 3.6 - 5.5 mmol/L    Chloride 103 96 - 112 mmol/L    Co2 25 20 - 33 mmol/L    Anion Gap 9.0 7.0 - 16.0    Glucose 88 65 - 99 mg/dL    Bun 11 8 - 22 mg/dL    Creatinine 0.66 0.50 - 1.40 mg/dL    Calcium 8.9 8.5 - 10.5 mg/dL    AST(SGOT) 28 12 - 45 U/L    ALT(SGPT) 33 2 - 50 U/L    Alkaline Phosphatase 113 (H) 30 - 99 U/L    Total Bilirubin 0.4 0.1 - 1.5 mg/dL    Albumin 4.1 3.2 - 4.9 g/dL    Total Protein 6.9 6.0 - 8.2 g/dL    Globulin 2.8 1.9 - 3.5 g/dL    A-G Ratio 1.5 g/dL   CBC WITH DIFFERENTIAL   Result Value Ref Range    WBC 8.0 4.8 - 10.8 K/uL    RBC 5.12 4.70 - 6.10 M/uL    Hemoglobin 15.8 14.0 - 18.0 g/dL    Hematocrit 49.0 42.0 - 52.0 %    MCV 95.7 81.4 - 97.8 fL    MCH 30.9 27.0 - 33.0 pg    MCHC 32.2 (L) 33.7 - 35.3 g/dL    RDW 48.9 35.9 - 50.0 fL    Platelet Count 277 164 - 446 K/uL    MPV 9.7 9.0 - 12.9 fL    Neutrophils-Polys 69.00 44.00 - 72.00 %    Lymphocytes 24.00 22.00 - 41.00 %    Monocytes 6.20 0.00 - 13.40 %    Eosinophils 0.00 0.00 - 6.90 %    Basophils 0.40 0.00 - 1.80 %    Immature Granulocytes 0.40 0.00 - 0.90 %    Nucleated RBC 0.00 /100 WBC    Neutrophils (Absolute) 5.49 1.82 - 7.42 K/uL    Lymphs (Absolute) 1.91 1.00 - 4.80 K/uL    Monos (Absolute) 0.49 0.00 - 0.85  K/uL    Eos (Absolute) 0.00 0.00 - 0.51 K/uL    Baso (Absolute) 0.03 0.00 - 0.12 K/uL    Immature Granulocytes (abs) 0.03 0.00 - 0.11 K/uL    NRBC (Absolute) 0.00 K/uL   DILANTIN   Result Value Ref Range    Phenytoin 13.1 10.0 - 20.0 ug/mL   VITAMIN D,25 HYDROXY   Result Value Ref Range    25-Hydroxy   Vitamin D 25 26 (L) 30 - 100 ng/mL   ESTIMATED GFR   Result Value Ref Range    GFR If African American >60 >60 mL/min/1.73 m 2    GFR If Non African American >60 >60 mL/min/1.73 m 2       Assessment & Plan:     68 y.o. male with the following -     Seizure disorder (HCC)  - Patient refilled at max dose of Dilantin, 200 mg twice daily.  - Patient encouraged to attend his upcoming appointment with the neurologist on 7/24/2023.  - Dilantin lab ordered to assess level.      Food insecurity  - Social work referral placed for additional food resources.    Routine health maintenance  -The patient declined a colonoscopy referral today.  Patient alternatively provided Cologuard.  -We will discuss tobacco cessation in a future appointment.     Return in about 4 weeks (around 8/4/2023), or Follow-up neurology.

## 2023-07-07 NOTE — ASSESSMENT & PLAN NOTE
He reported not ever undergoing colon cancer screening.  He reports smoking at least a pack a day of cigarettes.

## 2023-07-11 ENCOUNTER — PATIENT OUTREACH (OUTPATIENT)
Dept: HEALTH INFORMATION MANAGEMENT | Facility: OTHER | Age: 68
End: 2023-07-11
Payer: MEDICARE

## 2023-07-11 PROCEDURE — RXMED WILLOW AMBULATORY MEDICATION CHARGE: Performed by: STUDENT IN AN ORGANIZED HEALTH CARE EDUCATION/TRAINING PROGRAM

## 2023-07-11 NOTE — PROGRESS NOTES
07/11/2023  [facility] received a referral to help pt with Food stamps.  @[id number] ALFA called emerson Gutierrez with contact information.

## 2023-07-13 ENCOUNTER — PHARMACY VISIT (OUTPATIENT)
Dept: PHARMACY | Facility: MEDICAL CENTER | Age: 68
End: 2023-07-13
Payer: COMMERCIAL

## 2023-07-24 ENCOUNTER — APPOINTMENT (OUTPATIENT)
Dept: NEUROLOGY | Facility: MEDICAL CENTER | Age: 68
End: 2023-07-24
Attending: STUDENT IN AN ORGANIZED HEALTH CARE EDUCATION/TRAINING PROGRAM
Payer: MEDICARE
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
## 2023-08-08 ENCOUNTER — OFFICE VISIT (OUTPATIENT)
Dept: MEDICAL GROUP | Facility: CLINIC | Age: 68
End: 2023-08-08
Payer: MEDICARE

## 2023-08-08 VITALS
SYSTOLIC BLOOD PRESSURE: 112 MMHG | HEART RATE: 68 BPM | WEIGHT: 157 LBS | DIASTOLIC BLOOD PRESSURE: 72 MMHG | BODY MASS INDEX: 20.15 KG/M2 | RESPIRATION RATE: 16 BRPM | HEIGHT: 74 IN | OXYGEN SATURATION: 92 %

## 2023-08-08 DIAGNOSIS — G40.909 SEIZURE DISORDER (HCC): ICD-10-CM

## 2023-08-08 PROCEDURE — 3078F DIAST BP <80 MM HG: CPT | Performed by: STUDENT IN AN ORGANIZED HEALTH CARE EDUCATION/TRAINING PROGRAM

## 2023-08-08 PROCEDURE — 3074F SYST BP LT 130 MM HG: CPT | Performed by: STUDENT IN AN ORGANIZED HEALTH CARE EDUCATION/TRAINING PROGRAM

## 2023-08-08 PROCEDURE — 99213 OFFICE O/P EST LOW 20 MIN: CPT | Mod: GE | Performed by: STUDENT IN AN ORGANIZED HEALTH CARE EDUCATION/TRAINING PROGRAM

## 2023-08-08 RX ORDER — PHENOBARBITAL 32.4 MG/1
32.4 TABLET ORAL 3 TIMES DAILY
Qty: 270 TABLET | Refills: 0 | Status: SHIPPED | OUTPATIENT
Start: 2023-08-08 | End: 2023-11-07

## 2023-08-08 RX ORDER — PHENYTOIN SODIUM 100 MG/1
CAPSULE, EXTENDED RELEASE ORAL
Qty: 120 CAPSULE | Refills: 2 | Status: SHIPPED | OUTPATIENT
Start: 2023-08-08 | End: 2023-11-06 | Stop reason: SDUPTHER

## 2023-08-09 ENCOUNTER — PHARMACY VISIT (OUTPATIENT)
Dept: PHARMACY | Facility: MEDICAL CENTER | Age: 68
End: 2023-08-09
Payer: COMMERCIAL

## 2023-08-09 PROCEDURE — RXMED WILLOW AMBULATORY MEDICATION CHARGE: Performed by: STUDENT IN AN ORGANIZED HEALTH CARE EDUCATION/TRAINING PROGRAM

## 2023-08-09 NOTE — ASSESSMENT & PLAN NOTE
- Patient will provide refill of his Dilantin and phenobarbital as previously prescribed as he has compliance with his daily dosing and no seizures for 30 days

## 2023-08-09 NOTE — PROGRESS NOTES
"Subjective:     CC: Medication refill    HPI:   Gilbert presents today with     Problem   Seizure disorder (HCC)    \"The patient has a longstanding history of seizures since childhood. He reported having a seizure yesterday while sitting on a milk cart.  He said he woke up on the ground. He denied head trauma. He denies any headache at this time.  He denies any prodrome to his seizure.  He says he is unable to predict when he can have a seizure.  He has had a seizure once every month or so.  He does not drive at this time.  He either walks or takes a bus to get around town.  He reports having a \"nasty cold\" over the last 2 weeks.  He denies any other changes in his medications.  He reports being under chronic stress as he is only recently secured housing.  He reports needing additional resources as his current food stamp allocation is insufficient.\"  7/7/2023    This patient is a very pleasant 68-year-old male who is requesting a refill of his seizure medications.  He said previously he can only take 30-day supply of his medication at a time as that was a policy of the shelter that he was living at.  Now, he is living in an apartment and is able to have 90-day refills, which she would appreciate.  He says that he has good compliance with his medications now as he takes all of the medications in the morning.  He has been maintaining this practice for the last 3 days and has not had any seizures.  He said he had difficulty remembering to take his medications multiple times a day which led him to have seizures in the past.  Additionally, he does not have seizures while he is holding a cigar.                 Current Outpatient Medications Ordered in Epic   Medication Sig Dispense Refill    phenytoin ER (DILANTIN) 100 MG Cap Take 2 capsules by mouth twice a dayTake 2 capsules by mouth twice a day 120 Capsule 2    PHENobarbital (LUMINAL) 32.4 MG Tab Take 1 tablet by mouth 3 times a day for 30 days. 270 Tablet 0    vitamin " "D2, Ergocalciferol, (DRISDOL) 1.25 MG (56943 UT) Cap capsule Take 1 Capsule by mouth every 7 days. 12 Capsule 1     No current Epic-ordered facility-administered medications on file.       ROS negative unless stated in HPI.    Objective:     Exam:  /72 (BP Location: Right arm, Patient Position: Sitting, BP Cuff Size: Adult)   Pulse 68   Resp 16   Ht 1.88 m (6' 2\")   Wt 71.2 kg (157 lb)   SpO2 92%   BMI 20.16 kg/m²  Body mass index is 20.16 kg/m².    Physical Exam  Constitutional:       Appearance: Normal appearance. He is normal weight.   Pulmonary:      Effort: Pulmonary effort is normal. No respiratory distress.   Neurological:      General: No focal deficit present.      Mental Status: He is alert.   Psychiatric:         Mood and Affect: Mood normal.         Behavior: Behavior normal.       Labs:   Results for orders placed or performed during the hospital encounter of 06/15/21   PHENOBARBITAL   Result Value Ref Range    Phenobarbital 15.4 15.0 - 40.0 ug/mL   Comp Metabolic Panel   Result Value Ref Range    Sodium 137 135 - 145 mmol/L    Potassium 4.7 3.6 - 5.5 mmol/L    Chloride 103 96 - 112 mmol/L    Co2 25 20 - 33 mmol/L    Anion Gap 9.0 7.0 - 16.0    Glucose 88 65 - 99 mg/dL    Bun 11 8 - 22 mg/dL    Creatinine 0.66 0.50 - 1.40 mg/dL    Calcium 8.9 8.5 - 10.5 mg/dL    AST(SGOT) 28 12 - 45 U/L    ALT(SGPT) 33 2 - 50 U/L    Alkaline Phosphatase 113 (H) 30 - 99 U/L    Total Bilirubin 0.4 0.1 - 1.5 mg/dL    Albumin 4.1 3.2 - 4.9 g/dL    Total Protein 6.9 6.0 - 8.2 g/dL    Globulin 2.8 1.9 - 3.5 g/dL    A-G Ratio 1.5 g/dL   CBC WITH DIFFERENTIAL   Result Value Ref Range    WBC 8.0 4.8 - 10.8 K/uL    RBC 5.12 4.70 - 6.10 M/uL    Hemoglobin 15.8 14.0 - 18.0 g/dL    Hematocrit 49.0 42.0 - 52.0 %    MCV 95.7 81.4 - 97.8 fL    MCH 30.9 27.0 - 33.0 pg    MCHC 32.2 (L) 33.7 - 35.3 g/dL    RDW 48.9 35.9 - 50.0 fL    Platelet Count 277 164 - 446 K/uL    MPV 9.7 9.0 - 12.9 fL    Neutrophils-Polys 69.00 44.00 - " 72.00 %    Lymphocytes 24.00 22.00 - 41.00 %    Monocytes 6.20 0.00 - 13.40 %    Eosinophils 0.00 0.00 - 6.90 %    Basophils 0.40 0.00 - 1.80 %    Immature Granulocytes 0.40 0.00 - 0.90 %    Nucleated RBC 0.00 /100 WBC    Neutrophils (Absolute) 5.49 1.82 - 7.42 K/uL    Lymphs (Absolute) 1.91 1.00 - 4.80 K/uL    Monos (Absolute) 0.49 0.00 - 0.85 K/uL    Eos (Absolute) 0.00 0.00 - 0.51 K/uL    Baso (Absolute) 0.03 0.00 - 0.12 K/uL    Immature Granulocytes (abs) 0.03 0.00 - 0.11 K/uL    NRBC (Absolute) 0.00 K/uL   DILANTIN   Result Value Ref Range    Phenytoin 13.1 10.0 - 20.0 ug/mL   VITAMIN D,25 HYDROXY   Result Value Ref Range    25-Hydroxy   Vitamin D 25 26 (L) 30 - 100 ng/mL   ESTIMATED GFR   Result Value Ref Range    GFR If African American >60 >60 mL/min/1.73 m 2    GFR If Non African American >60 >60 mL/min/1.73 m 2       Assessment & Plan:     68 y.o. male with the following -     Seizure disorder (HCC)  - Patient will provide refill of his Dilantin and phenobarbital as previously prescribed as he has compliance with his daily dosing and no seizures for 30 days       Return in about 3 months (around 11/8/2023), or Medication refill.

## 2023-09-05 ENCOUNTER — PHARMACY VISIT (OUTPATIENT)
Dept: PHARMACY | Facility: MEDICAL CENTER | Age: 68
End: 2023-09-05
Payer: COMMERCIAL

## 2023-09-05 PROCEDURE — RXMED WILLOW AMBULATORY MEDICATION CHARGE: Performed by: STUDENT IN AN ORGANIZED HEALTH CARE EDUCATION/TRAINING PROGRAM

## 2023-10-06 ENCOUNTER — PHARMACY VISIT (OUTPATIENT)
Dept: PHARMACY | Facility: MEDICAL CENTER | Age: 68
End: 2023-10-06
Payer: COMMERCIAL

## 2023-10-06 PROCEDURE — RXMED WILLOW AMBULATORY MEDICATION CHARGE: Performed by: STUDENT IN AN ORGANIZED HEALTH CARE EDUCATION/TRAINING PROGRAM

## 2023-11-06 ENCOUNTER — PHARMACY VISIT (OUTPATIENT)
Dept: PHARMACY | Facility: MEDICAL CENTER | Age: 68
End: 2023-11-06
Payer: COMMERCIAL

## 2023-11-06 DIAGNOSIS — G40.909 SEIZURE DISORDER (HCC): ICD-10-CM

## 2023-11-06 PROCEDURE — RXMED WILLOW AMBULATORY MEDICATION CHARGE: Performed by: STUDENT IN AN ORGANIZED HEALTH CARE EDUCATION/TRAINING PROGRAM

## 2023-11-06 RX ORDER — PHENYTOIN SODIUM 100 MG/1
CAPSULE, EXTENDED RELEASE ORAL
Qty: 120 CAPSULE | Refills: 2 | Status: CANCELLED | OUTPATIENT
Start: 2023-11-06 | End: 2024-02-06

## 2023-11-06 RX ORDER — PHENYTOIN SODIUM 100 MG/1
CAPSULE, EXTENDED RELEASE ORAL
Qty: 120 CAPSULE | Refills: 2 | Status: SHIPPED | OUTPATIENT
Start: 2023-11-06 | End: 2024-01-09 | Stop reason: SDUPTHER

## 2023-11-08 PROCEDURE — RXMED WILLOW AMBULATORY MEDICATION CHARGE: Performed by: STUDENT IN AN ORGANIZED HEALTH CARE EDUCATION/TRAINING PROGRAM

## 2023-11-10 ENCOUNTER — PHARMACY VISIT (OUTPATIENT)
Dept: PHARMACY | Facility: MEDICAL CENTER | Age: 68
End: 2023-11-10
Payer: COMMERCIAL

## 2023-11-10 ENCOUNTER — OFFICE VISIT (OUTPATIENT)
Dept: MEDICAL GROUP | Facility: CLINIC | Age: 68
End: 2023-11-10
Payer: MEDICARE

## 2023-11-10 VITALS
TEMPERATURE: 98 F | WEIGHT: 164.5 LBS | OXYGEN SATURATION: 97 % | DIASTOLIC BLOOD PRESSURE: 82 MMHG | HEART RATE: 84 BPM | HEIGHT: 75 IN | SYSTOLIC BLOOD PRESSURE: 138 MMHG | BODY MASS INDEX: 20.45 KG/M2

## 2023-11-10 DIAGNOSIS — G40.909 SEIZURE DISORDER (HCC): ICD-10-CM

## 2023-11-10 PROCEDURE — 3079F DIAST BP 80-89 MM HG: CPT

## 2023-11-10 PROCEDURE — 3075F SYST BP GE 130 - 139MM HG: CPT

## 2023-11-10 PROCEDURE — 99213 OFFICE O/P EST LOW 20 MIN: CPT | Mod: GE

## 2023-11-10 RX ORDER — PHENOBARBITAL 32.4 MG/1
32.4 TABLET ORAL 3 TIMES DAILY
Qty: 270 TABLET | Refills: 0 | Status: CANCELLED | OUTPATIENT
Start: 2023-11-10 | End: 2024-02-08

## 2023-11-10 RX ORDER — PHENYTOIN SODIUM 100 MG/1
CAPSULE, EXTENDED RELEASE ORAL
Qty: 120 CAPSULE | Refills: 5 | Status: SHIPPED | OUTPATIENT
Start: 2023-11-10 | End: 2023-11-10

## 2023-11-10 RX ORDER — PHENYTOIN SODIUM 100 MG/1
CAPSULE, EXTENDED RELEASE ORAL
Qty: 120 CAPSULE | Refills: 5 | Status: SHIPPED | OUTPATIENT
Start: 2023-11-10 | End: 2024-01-09

## 2023-11-13 NOTE — PROGRESS NOTES
[facility] MEDICINE     PATIENT ID:  NAME:  Gilbert Aldridge  MRN:               [id number]  YOB: 1955    Date: 3:07 PM    As the attending physician, I discussed the history and physical exam on this patient and reviewed pertinent labs/diagnostic tests/radiologic review and results. I provided face-to-face coordination with the health care team inclusive of residents and medical students and performed the pertinent part of the exam. I discussed the patient's assessment, management and plan of care as reflected in the documentation above.   Participation of care issue: Medication refill. Seizure disorder.         Resident: Wild Sheriff M.D.    CC:  Seizure      HPI: Gilbert Aldridge is a 68 y.o. male who presented for medication refill.  The patient states that he has a longstanding history of seizure disorder since he was a child.  He states that he has been on phenytoin 200 mg twice daily for longer than the past 10 years and has been very stable on this medication.  He states that his last known seizure was greater than 1 month ago.  He states that he does have a neurologist who is following up with currently but due to an issue with his pharmacy he will be out of his medication tomorrow and is requesting refill at this time.  Otherwise the patient states he is at his baseline state of health and does not want to discuss any other concerns at this time.    No problems updated.    REVIEW OF SYSTEMS:   Ten systems reviewed and were negative except as noted in the HPI.                PROBLEM LIST  Patient Active Problem List   Diagnosis    Seizure disorder (HCC)    Routine health maintenance    Vitamin D deficiency    At risk for osteoporosis    Memory deficit    Gastroesophageal reflux disease without esophagitis    Homeless    Food insecurity        PAST SURGICAL HISTORY:  Past Surgical History:   Procedure Laterality Date    APPENDECTOMY         FAMILY HISTORY:  Family History   Problem Relation  "Age of Onset    Heart Disease Maternal Grandmother     GI Disease Mother     Stroke Paternal Grandmother     Cancer Neg Hx     Diabetes Neg Hx        SOCIAL HISTORY:   Social History     Tobacco Use    Smoking status: Every Day     Types: Cigars    Smokeless tobacco: Never    Tobacco comments:     advise no tobacco products   Substance Use Topics    Alcohol use: Yes     Comment: occasional beer       ALLERGIES:  Allergies   Allergen Reactions    Nkda [No Known Drug Allergy]        OUTPATIENT MEDICATIONS:    Current Outpatient Medications:     phenytoin ER (DILANTIN) 100 MG Cap, Take 2 capsules by mouth twice a day, Disp: 120 Capsule, Rfl: 5    vitamin D2, Ergocalciferol, (DRISDOL) 1.25 MG (42922 UT) Cap capsule, Take 1 Capsule by mouth every 7 days., Disp: 12 Capsule, Rfl: 1    phenytoin ER (DILANTIN) 100 MG Cap, Take 2 capsules by mouth twice a day, Disp: 120 Capsule, Rfl: 2    PHYSICAL EXAM:  Vitals:    11/10/23 1412   BP: 138/82   BP Location: Left arm   Patient Position: Sitting   BP Cuff Size: Adult   Pulse: 84   Temp: 36.7 °C (98 °F)   TempSrc: Temporal   SpO2: 97%   Weight: 74.6 kg (164 lb 8 oz)   Height: 1.905 m (6' 3\")       General: Pt resting in NAD, pleasant and cooperative   Skin:  Pink, warm and dry.  HEENT: NC/AT. EOMI.  Lungs:  Symmetrical.  CTAB, good air movement, no adventitious sounds  Cardiovascular:  S1/S2 RRR, no murmurs rubs or gallops  Abdomen:  Abdomen is soft, nontender  Extremities:  Full range of motion.  CNS:  Muscle tone is normal. No gross focal neurologic deficits, during interview patient did have approximate 1 minute episode of staring off into space with minimal right hand twitching where he was not responsive, immediately after this event patient did start to converse normally but was mildly confused, initially alert and oriented x1 which improved to alert and oriented x4 over a period of 5 minutes.  Reflexes intact, cranial nerves II through XII intact, 5 out of 5 strength, " sensation intact      ASSESSMENT/PLAN:   68 y.o. male who presents to clinic for refill of his longstanding seizure medication.  Patient states that he has a history of seizure disorder that goes back to when he was a child.  He states he has been stable on phenytoin 200 mg twice daily but recently has not been able to take the medication as prescribed due to an issue with his pharmacy and contacting his neurologist.  On exam today patient did appear to have a absence seizure versus partial seizure which lasted approximately 1 to 2 minutes, patient recovered quickly with a short postictal period but became alert and oriented x4 within 5 to 10 minutes.  Advised patient that he should get a ride to go  his medication so he can continue at his prescribed dose.  He states that he does not have any friends that can currently give him a ride but he will take the bus to  his medications at this time.  He states he does not currently have a phone and has to walk to clinic to be seen.  We will refill his medication at this time and patient will continue to follow-up at his neurology clinic, states he has an appointment next month.  Advised patient to set up follow-up appointment with me in 1 month so that we can continue to make sure he has a stable supply of his medications, also advised him that he should continue to follow with the neurology clinic for continued evaluation of his seizure disorder.    Problem List Items Addressed This Visit       Seizure disorder (HCC)    Relevant Medications    phenytoin ER (DILANTIN) 100 MG Cap       Wild Sheriff M.D.  PGY-3  [facility]

## 2023-11-17 DIAGNOSIS — G40.909 SEIZURE DISORDER (HCC): ICD-10-CM

## 2023-11-20 ENCOUNTER — PHARMACY VISIT (OUTPATIENT)
Dept: PHARMACY | Facility: MEDICAL CENTER | Age: 68
End: 2023-11-20
Payer: COMMERCIAL

## 2023-11-20 PROCEDURE — RXMED WILLOW AMBULATORY MEDICATION CHARGE

## 2023-11-20 RX ORDER — PHENOBARBITAL 32.4 MG/1
32.4 TABLET ORAL 3 TIMES DAILY
Qty: 90 TABLET | Refills: 0 | Status: SHIPPED | OUTPATIENT
Start: 2023-11-20 | End: 2023-12-12 | Stop reason: SDUPTHER

## 2023-11-29 PROCEDURE — RXMED WILLOW AMBULATORY MEDICATION CHARGE

## 2023-12-12 ENCOUNTER — PHARMACY VISIT (OUTPATIENT)
Dept: PHARMACY | Facility: MEDICAL CENTER | Age: 68
End: 2023-12-12
Payer: COMMERCIAL

## 2023-12-12 ENCOUNTER — OFFICE VISIT (OUTPATIENT)
Dept: MEDICAL GROUP | Facility: CLINIC | Age: 68
End: 2023-12-12
Payer: MEDICARE

## 2023-12-12 VITALS
OXYGEN SATURATION: 97 % | DIASTOLIC BLOOD PRESSURE: 82 MMHG | WEIGHT: 170 LBS | HEART RATE: 68 BPM | BODY MASS INDEX: 21.82 KG/M2 | SYSTOLIC BLOOD PRESSURE: 138 MMHG | TEMPERATURE: 97.8 F | HEIGHT: 74 IN

## 2023-12-12 DIAGNOSIS — G40.909 SEIZURE DISORDER (HCC): ICD-10-CM

## 2023-12-12 DIAGNOSIS — Z79.899 CONTROLLED SUBSTANCE AGREEMENT SIGNED: ICD-10-CM

## 2023-12-12 DIAGNOSIS — Z59.819 HOUSING INSECURITY: ICD-10-CM

## 2023-12-12 PROCEDURE — RXMED WILLOW AMBULATORY MEDICATION CHARGE

## 2023-12-12 PROCEDURE — 99213 OFFICE O/P EST LOW 20 MIN: CPT | Mod: GE

## 2023-12-12 PROCEDURE — 3079F DIAST BP 80-89 MM HG: CPT

## 2023-12-12 PROCEDURE — 3075F SYST BP GE 130 - 139MM HG: CPT

## 2023-12-12 RX ORDER — PHENOBARBITAL 32.4 MG/1
32.4 TABLET ORAL 3 TIMES DAILY
Qty: 90 TABLET | Refills: 0 | Status: SHIPPED | OUTPATIENT
Start: 2023-12-20 | End: 2024-01-09 | Stop reason: SDUPTHER

## 2023-12-12 RX ORDER — PHENYTOIN SODIUM 100 MG/1
CAPSULE, EXTENDED RELEASE ORAL
Qty: 120 CAPSULE | Refills: 5 | Status: SHIPPED | OUTPATIENT
Start: 2023-12-12 | End: 2024-01-09

## 2023-12-12 SDOH — ECONOMIC STABILITY - HOUSING INSECURITY: HOUSING INSTABILITY UNSPECIFIED: Z59.819

## 2023-12-12 NOTE — PROGRESS NOTES
[facility]     PATIENT ID:  NAME:  Gilbert Aldridge  MRN:               [id number]  YOB: 1955    Date: 1:57 PM      Resident: Wild Sheriff M.D.    CC:  Seizure disorder      HPI: Gilbert Aldridge is a 68 y.o. male who presented with a longstanding history of seizure disorder.  The patient states that he has been on phenobarbital and phenytoin since he was a child for control of his seizure disorder.  He notes that he has seen multiple neurologist in the past they have tried different medication regimens with him but this has been the only regimen that has been able to control his seizures.  Patient is currently experiencing housing insecurity but states that he does have a room currently.  He is trying to move to [place] at this time to live closer to his brother who he says he will help take care of him.  Patient is interested in resources at this time.  Otherwise patient states he is at his baseline state of health and has no further concerns.    No problems updated.    REVIEW OF SYSTEMS:   Ten systems reviewed and were negative except as noted in the HPI.                PROBLEM LIST  Patient Active Problem List   Diagnosis    Seizure disorder (HCC)    Routine health maintenance    Vitamin D deficiency    At risk for osteoporosis    Memory deficit    Gastroesophageal reflux disease without esophagitis    Homeless    Food insecurity        PAST SURGICAL HISTORY:  Past Surgical History:   Procedure Laterality Date    APPENDECTOMY         FAMILY HISTORY:  Family History   Problem Relation Age of Onset    Heart Disease Maternal Grandmother     GI Disease Mother     Stroke Paternal Grandmother     Cancer Neg Hx     Diabetes Neg Hx        SOCIAL HISTORY:   Social History     Tobacco Use    Smoking status: Every Day     Types: Cigars    Smokeless tobacco: Never    Tobacco comments:     advise no tobacco products   Substance Use Topics    Alcohol use: Yes     Comment: occasional beer  "      ALLERGIES:  Allergies   Allergen Reactions    Nkda [No Known Drug Allergy]        OUTPATIENT MEDICATIONS:    Current Outpatient Medications:     phenytoin ER (DILANTIN) 100 MG Cap, Take 2 capsules by mouth twice a day, Disp: 120 Capsule, Rfl: 5    [START ON 12/20/2023] PHENobarbital 32.4 MG Tab, Take 1 Tablet by mouth 3 times a day for 30 days., Disp: 90 Tablet, Rfl: 0    phenytoin ER (DILANTIN) 100 MG Cap, Take 2 capsules by mouth twice a day, Disp: 120 Capsule, Rfl: 2    vitamin D2, Ergocalciferol, (DRISDOL) 1.25 MG (19501 UT) Cap capsule, Take 1 Capsule by mouth every 7 days., Disp: 12 Capsule, Rfl: 1    PHYSICAL EXAM:  Vitals:    12/12/23 1339   BP: 138/82   BP Location: Left arm   Patient Position: Sitting   BP Cuff Size: Adult   Pulse: 68   Temp: 36.6 °C (97.8 °F)   TempSrc: Temporal   SpO2: 97%   Weight: 77.1 kg (170 lb)   Height: 1.88 m (6' 2\")       General: Pt resting in NAD, pleasant and cooperative   Skin:  Pink, warm and dry.  HEENT: NC/AT. EOMI.  Lungs:  Symmetrical.  CTAB, good air movement, no adventitious sounds  Cardiovascular:  S1/S2 RRR, no murmurs rubs or gallops  Abdomen:  Abdomen is soft, nontender  Extremities:  Full range of motion.  CNS:  Muscle tone is normal. No gross focal neurologic deficits      ASSESSMENT/PLAN:   68 y.o. male who presents to clinic for refill of his seizure medication.  At the time of the patient's last visit he did experience a seizure while in clinic.  He attributed this to difficulties with having his medications filled.  I encouraged patient to reestablish with neurology for continued management of Dilantin and phenobarbital.  At this time I will fill his medications.  He states that he has a follow-up with neurology early next year.  He does note that in the past his medication regimen has been changed multiple times but he has never been able to control his seizures without these 2 medications.  I reiterated that he should follow-up with neurology as soon " as possible which patient understands and agrees with.    On chart review no evidence of controlled substance agreement on file, this was filled out at today's visit.    Patient notes housing insecurity, will provide referral to social work so that patient can discuss possible resources that may help him.    Problem List Items Addressed This Visit       Seizure disorder (HCC)    Relevant Medications    phenytoin ER (DILANTIN) 100 MG Cap    PHENobarbital 32.4 MG Tab (Start on 12/20/2023)     Other Visit Diagnoses       Housing insecurity        Relevant Orders    REFERRAL TO CARE MANAGEMENT    Controlled substance agreement signed        Relevant Orders    Controlled Substance Treatment Agreement            Wild Sheriff M.D.  PGY-3  [facility]

## 2024-01-09 ENCOUNTER — OFFICE VISIT (OUTPATIENT)
Dept: MEDICAL GROUP | Facility: CLINIC | Age: 69
End: 2024-01-09
Payer: MEDICARE

## 2024-01-09 VITALS
BODY MASS INDEX: 21.29 KG/M2 | DIASTOLIC BLOOD PRESSURE: 84 MMHG | OXYGEN SATURATION: 96 % | HEART RATE: 78 BPM | TEMPERATURE: 97.7 F | HEIGHT: 75 IN | SYSTOLIC BLOOD PRESSURE: 128 MMHG | WEIGHT: 171.2 LBS

## 2024-01-09 DIAGNOSIS — G40.909 SEIZURE DISORDER (HCC): ICD-10-CM

## 2024-01-09 PROCEDURE — 3074F SYST BP LT 130 MM HG: CPT

## 2024-01-09 PROCEDURE — 3079F DIAST BP 80-89 MM HG: CPT

## 2024-01-09 PROCEDURE — RXMED WILLOW AMBULATORY MEDICATION CHARGE

## 2024-01-09 PROCEDURE — 99213 OFFICE O/P EST LOW 20 MIN: CPT | Mod: GE

## 2024-01-09 RX ORDER — PHENYTOIN SODIUM 100 MG/1
CAPSULE, EXTENDED RELEASE ORAL
Qty: 120 CAPSULE | Refills: 2 | Status: SHIPPED | OUTPATIENT
Start: 2024-01-09 | End: 2024-04-10

## 2024-01-09 RX ORDER — PHENOBARBITAL 32.4 MG/1
32.4 TABLET ORAL 3 TIMES DAILY
Qty: 90 TABLET | Refills: 0 | Status: SHIPPED | OUTPATIENT
Start: 2024-01-09 | End: 2024-02-09

## 2024-01-10 ENCOUNTER — PHARMACY VISIT (OUTPATIENT)
Dept: PHARMACY | Facility: MEDICAL CENTER | Age: 69
End: 2024-01-10
Payer: COMMERCIAL

## 2024-01-10 NOTE — PROGRESS NOTES
[facility] MEDICINE     PATIENT ID:  NAME:  Gilbert Aldridge  MRN:               [id number]  YOB: 1955    Date: 5:10 PM      Resident: Wild Sheriff M.D.    CC:  Medication refill      HPI: Gilbert Aldridge is a 68 y.o. male who presented with longstanding history of seizure disorder. The patient has been on phenobarbital and phenytoin for a long time, since he was a child, with a few attempts by providers to switch his medication regimen resulting in breakthrough seizures. He states he has an appointment with neurology but as he is currently homeless is planning to move to [place] to live with his brother and will be leaving in 2 days.  He states he does not have follow up set up in [place] and is thankful for any help that can be provided. He states that otherwise he is in his baseline state of health and has no further concerns.     No problems updated.    REVIEW OF SYSTEMS:   Ten systems reviewed and were negative except as noted in the HPI.                PROBLEM LIST  Patient Active Problem List   Diagnosis    Seizure disorder (HCC)    Routine health maintenance    Vitamin D deficiency    At risk for osteoporosis    Memory deficit    Gastroesophageal reflux disease without esophagitis    Homeless    Food insecurity        PAST SURGICAL HISTORY:  Past Surgical History:   Procedure Laterality Date    APPENDECTOMY         FAMILY HISTORY:  Family History   Problem Relation Age of Onset    Heart Disease Maternal Grandmother     GI Disease Mother     Stroke Paternal Grandmother     Cancer Neg Hx     Diabetes Neg Hx        SOCIAL HISTORY:   Social History     Tobacco Use    Smoking status: Every Day     Types: Cigars    Smokeless tobacco: Never    Tobacco comments:     advise no tobacco products   Substance Use Topics    Alcohol use: Yes     Comment: occasional beer       ALLERGIES:  Allergies   Allergen Reactions    Nkda [No Known Drug Allergy]        OUTPATIENT MEDICATIONS:    Current  "Outpatient Medications:     PHENobarbital 32.4 MG Tab, Take 1 Tablet by mouth 3 times a day for 30 days., Disp: 90 Tablet, Rfl: 0    phenytoin ER (DILANTIN) 100 MG Cap, Take 2 capsules by mouth twice a day, Disp: 120 Capsule, Rfl: 2    vitamin D2, Ergocalciferol, (DRISDOL) 1.25 MG (41586 UT) Cap capsule, Take 1 Capsule by mouth every 7 days., Disp: 12 Capsule, Rfl: 1    PHYSICAL EXAM:  Vitals:    01/09/24 1607   BP: 128/84   BP Location: Left arm   Patient Position: Sitting   BP Cuff Size: Adult   Pulse: 78   Temp: 36.5 °C (97.7 °F)   TempSrc: Temporal   SpO2: 96%   Weight: 77.7 kg (171 lb 3.2 oz)   Height: 1.905 m (6' 3\")       General: Pt resting in NAD, pleasant and cooperative   Skin:  Pink, warm and dry.  HEENT: NC/AT. EOMI.  Lungs:  Symmetrical.  CTAB, good air movement, no adventitious sounds   Cardiovascular:  S1/S2 RRR, no murmurs rubs or gallops   Abdomen:  Abdomen is soft, nontender  Extremities:  Full range of motion.  CNS:  Muscle tone is normal. No gross focal neurologic deficits      ASSESSMENT/PLAN:   68 y.o. male who presents to clinic for refill of his chronic anti-epileptic medications and help with securing follow up for his impending move to [place] in 2 days. He states he has not currently made an appointment with any physicians in [place] but does have an appointment with neurology from his previous referral which he will be leaving too soon to make it to. At this time will refill his chronic medications for 1 month and provide new expedited referral to neurology in [place] given his current controlled seizure medication regimen of phenobarbital and dilantin. Will also provide referral to care management to attempt to ensure that the transition is smooth and hopefully assist in providing help securing Primary care referral as well. Advised patient that he should be proactive in ensuring he has referrals and appointment's set prior to his medications running out in 1 month and if he is " not able to be seen in that time that he should be seen in the ED to ensure that he is not without his seizure medications as he has experienced seizures in the past each time he has been switched to new medications or ran out.     Problem List Items Addressed This Visit       Seizure disorder (HCC)    Relevant Medications    PHENobarbital 32.4 MG Tab    phenytoin ER (DILANTIN) 100 MG Cap    Other Relevant Orders    Referral to Neurology    REFERRAL TO CARE MANAGEMENT       Wild Sheriff M.D.  PGY-3  [facility]